# Patient Record
Sex: FEMALE | Race: WHITE | NOT HISPANIC OR LATINO | Employment: UNEMPLOYED | URBAN - METROPOLITAN AREA
[De-identification: names, ages, dates, MRNs, and addresses within clinical notes are randomized per-mention and may not be internally consistent; named-entity substitution may affect disease eponyms.]

---

## 2017-06-02 ENCOUNTER — HISTORICAL (OUTPATIENT)
Dept: LAB | Facility: HOSPITAL | Age: 72
End: 2017-06-02

## 2017-06-02 LAB
ABS NEUT (OLG): 3.58 X10(3)/MCL (ref 2.1–9.2)
ALBUMIN SERPL-MCNC: 3.6 GM/DL (ref 3.4–5)
ALBUMIN/GLOB SERPL: 1.2 {RATIO}
ALP SERPL-CCNC: 61 UNIT/L (ref 38–126)
ALT SERPL-CCNC: 27 UNIT/L (ref 12–78)
AST SERPL-CCNC: 17 UNIT/L (ref 15–37)
BASOPHILS # BLD AUTO: 0.1 X10(3)/MCL (ref 0–0.2)
BASOPHILS NFR BLD AUTO: 1 %
BILIRUB SERPL-MCNC: 0.4 MG/DL (ref 0.2–1)
BILIRUBIN DIRECT+TOT PNL SERPL-MCNC: 0.2 MG/DL (ref 0–0.2)
BILIRUBIN DIRECT+TOT PNL SERPL-MCNC: 0.2 MG/DL (ref 0–0.8)
BUN SERPL-MCNC: 15 MG/DL (ref 7–18)
CALCIUM SERPL-MCNC: 9.5 MG/DL (ref 8.5–10.1)
CHLORIDE SERPL-SCNC: 105 MMOL/L (ref 98–107)
CHOLEST SERPL-MCNC: 142 MG/DL (ref 0–200)
CHOLEST/HDLC SERPL: 1.7 {RATIO} (ref 0–4)
CO2 SERPL-SCNC: 27 MMOL/L (ref 21–32)
CREAT SERPL-MCNC: 0.91 MG/DL (ref 0.55–1.02)
CREAT UR-MCNC: 40.7 MG/DL
EOSINOPHIL # BLD AUTO: 0.2 X10(3)/MCL (ref 0–0.9)
EOSINOPHIL NFR BLD AUTO: 3 %
ERYTHROCYTE [DISTWIDTH] IN BLOOD BY AUTOMATED COUNT: 12.7 % (ref 11.5–17)
EST. AVERAGE GLUCOSE BLD GHB EST-MCNC: 143 MG/DL
GLOBULIN SER-MCNC: 2.9 GM/DL (ref 2.4–3.5)
GLUCOSE SERPL-MCNC: 193 MG/DL (ref 74–106)
HBA1C MFR BLD: 6.6 % (ref 4.2–6.3)
HCT VFR BLD AUTO: 39.7 % (ref 37–47)
HDLC SERPL-MCNC: 83 MG/DL (ref 35–60)
HGB BLD-MCNC: 12.8 GM/DL (ref 12–16)
LDLC SERPL CALC-MCNC: 46 MG/DL (ref 0–129)
LYMPHOCYTES # BLD AUTO: 1.7 X10(3)/MCL (ref 0.6–4.6)
LYMPHOCYTES NFR BLD AUTO: 29 %
MCH RBC QN AUTO: 29.3 PG (ref 27–31)
MCHC RBC AUTO-ENTMCNC: 32.2 GM/DL (ref 33–36)
MCV RBC AUTO: 90.8 FL (ref 80–94)
MICROALBUMIN UR-MCNC: 0.9 MG/DL
MICROALBUMIN/CREAT RATIO PNL UR: 22.1 MG/GM CR (ref 0–30)
MONOCYTES # BLD AUTO: 0.4 X10(3)/MCL (ref 0.1–1.3)
MONOCYTES NFR BLD AUTO: 6 %
NEUTROPHILS # BLD AUTO: 3.58 X10(3)/MCL (ref 1.4–7.9)
NEUTROPHILS NFR BLD AUTO: 60 %
PLATELET # BLD AUTO: 331 X10(3)/MCL (ref 130–400)
PMV BLD AUTO: 9 FL (ref 9.4–12.4)
POTASSIUM SERPL-SCNC: 4.1 MMOL/L (ref 3.5–5.1)
PROT SERPL-MCNC: 6.5 GM/DL (ref 6.4–8.2)
RBC # BLD AUTO: 4.37 X10(6)/MCL (ref 4.2–5.4)
SODIUM SERPL-SCNC: 142 MMOL/L (ref 136–145)
TRIGL SERPL-MCNC: 63 MG/DL (ref 30–150)
TSH SERPL-ACNC: 1.13 MIU/ML (ref 0.36–3.74)
VLDLC SERPL CALC-MCNC: 13 MG/DL
WBC # SPEC AUTO: 6 X10(3)/MCL (ref 4.5–11.5)

## 2017-11-30 ENCOUNTER — HISTORICAL (OUTPATIENT)
Dept: LAB | Facility: HOSPITAL | Age: 72
End: 2017-11-30

## 2017-11-30 LAB
ABS NEUT (OLG): 4.54 X10(3)/MCL (ref 2.1–9.2)
ALBUMIN SERPL-MCNC: 4.2 GM/DL (ref 3.4–5)
ALBUMIN/GLOB SERPL: 1.2 RATIO (ref 1.1–2)
ALP SERPL-CCNC: 67 UNIT/L (ref 38–126)
ALT SERPL-CCNC: 28 UNIT/L (ref 12–78)
AST SERPL-CCNC: 20 UNIT/L (ref 15–37)
BASOPHILS # BLD AUTO: 0.1 X10(3)/MCL (ref 0–0.2)
BASOPHILS NFR BLD AUTO: 1 %
BILIRUB SERPL-MCNC: 0.7 MG/DL (ref 0.2–1)
BILIRUBIN DIRECT+TOT PNL SERPL-MCNC: 0.2 MG/DL (ref 0–0.5)
BILIRUBIN DIRECT+TOT PNL SERPL-MCNC: 0.5 MG/DL (ref 0–0.8)
BUN SERPL-MCNC: 17 MG/DL (ref 7–18)
CALCIUM SERPL-MCNC: 10 MG/DL (ref 8.5–10.1)
CHLORIDE SERPL-SCNC: 104 MMOL/L (ref 98–107)
CHOLEST SERPL-MCNC: 189 MG/DL (ref 0–200)
CHOLEST/HDLC SERPL: 1.8 {RATIO} (ref 0–4)
CO2 SERPL-SCNC: 28 MMOL/L (ref 21–32)
CREAT SERPL-MCNC: 0.82 MG/DL (ref 0.55–1.02)
CREAT UR-MCNC: 25.6 MG/DL
EOSINOPHIL # BLD AUTO: 0.3 X10(3)/MCL (ref 0–0.9)
EOSINOPHIL NFR BLD AUTO: 3 %
ERYTHROCYTE [DISTWIDTH] IN BLOOD BY AUTOMATED COUNT: 13.1 % (ref 11.5–17)
EST. AVERAGE GLUCOSE BLD GHB EST-MCNC: 148 MG/DL
GLOBULIN SER-MCNC: 3.4 GM/DL (ref 2.4–3.5)
GLUCOSE SERPL-MCNC: 85 MG/DL (ref 74–106)
HBA1C MFR BLD: 6.8 % (ref 4.2–6.3)
HCT VFR BLD AUTO: 42.5 % (ref 37–47)
HDLC SERPL-MCNC: 106 MG/DL (ref 35–60)
HGB BLD-MCNC: 13.5 GM/DL (ref 12–16)
LDLC SERPL CALC-MCNC: 67 MG/DL (ref 0–129)
LYMPHOCYTES # BLD AUTO: 2.1 X10(3)/MCL (ref 0.6–4.6)
LYMPHOCYTES NFR BLD AUTO: 28 %
MCH RBC QN AUTO: 28.8 PG (ref 27–31)
MCHC RBC AUTO-ENTMCNC: 31.8 GM/DL (ref 33–36)
MCV RBC AUTO: 90.8 FL (ref 80–94)
MICROALBUMIN UR-MCNC: 0.9 MG/DL
MICROALBUMIN/CREAT RATIO PNL UR: 35.8 MG/GM CR (ref 0–30)
MONOCYTES # BLD AUTO: 0.6 X10(3)/MCL (ref 0.1–1.3)
MONOCYTES NFR BLD AUTO: 7 %
NEUTROPHILS # BLD AUTO: 4.54 X10(3)/MCL (ref 1.4–7.9)
NEUTROPHILS NFR BLD AUTO: 60 %
PLATELET # BLD AUTO: 336 X10(3)/MCL (ref 130–400)
PMV BLD AUTO: 8.7 FL (ref 9.4–12.4)
POTASSIUM SERPL-SCNC: 4.2 MMOL/L (ref 3.5–5.1)
PROT SERPL-MCNC: 7.6 GM/DL (ref 6.4–8.2)
RBC # BLD AUTO: 4.68 X10(6)/MCL (ref 4.2–5.4)
SODIUM SERPL-SCNC: 141 MMOL/L (ref 136–145)
TRIGL SERPL-MCNC: 80 MG/DL (ref 30–150)
TSH SERPL-ACNC: 0.92 MIU/ML (ref 0.36–3.74)
VLDLC SERPL CALC-MCNC: 16 MG/DL
WBC # SPEC AUTO: 7.6 X10(3)/MCL (ref 4.5–11.5)

## 2018-02-16 ENCOUNTER — HISTORICAL (OUTPATIENT)
Dept: LAB | Facility: HOSPITAL | Age: 73
End: 2018-02-16

## 2018-02-16 LAB
ABS NEUT (OLG): 5.95 X10(3)/MCL (ref 2.1–9.2)
ALBUMIN SERPL-MCNC: 4 GM/DL (ref 3.4–5)
ALBUMIN/GLOB SERPL: 1.1 {RATIO}
ALP SERPL-CCNC: 69 UNIT/L (ref 38–126)
ALT SERPL-CCNC: 27 UNIT/L (ref 12–78)
AST SERPL-CCNC: 16 UNIT/L (ref 15–37)
BASOPHILS # BLD AUTO: 0.1 X10(3)/MCL (ref 0–0.2)
BASOPHILS NFR BLD AUTO: 1 %
BILIRUB SERPL-MCNC: 0.6 MG/DL (ref 0.2–1)
BILIRUBIN DIRECT+TOT PNL SERPL-MCNC: 0.2 MG/DL (ref 0–0.2)
BILIRUBIN DIRECT+TOT PNL SERPL-MCNC: 0.4 MG/DL (ref 0–0.8)
BUN SERPL-MCNC: 20 MG/DL (ref 7–18)
CALCIUM SERPL-MCNC: 9.9 MG/DL (ref 8.5–10.1)
CHLORIDE SERPL-SCNC: 100 MMOL/L (ref 98–107)
CO2 SERPL-SCNC: 26 MMOL/L (ref 21–32)
CREAT SERPL-MCNC: 0.95 MG/DL (ref 0.55–1.02)
CREAT UR-MCNC: 28.7 MG/DL
EOSINOPHIL # BLD AUTO: 0.2 X10(3)/MCL (ref 0–0.9)
EOSINOPHIL NFR BLD AUTO: 2 %
ERYTHROCYTE [DISTWIDTH] IN BLOOD BY AUTOMATED COUNT: 12.6 % (ref 11.5–17)
EST. AVERAGE GLUCOSE BLD GHB EST-MCNC: 163 MG/DL
GLOBULIN SER-MCNC: 3.5 GM/DL (ref 2.4–3.5)
GLUCOSE SERPL-MCNC: 110 MG/DL (ref 74–106)
HBA1C MFR BLD: 7.3 % (ref 4.2–6.3)
HCT VFR BLD AUTO: 41.3 % (ref 37–47)
HGB BLD-MCNC: 13.8 GM/DL (ref 12–16)
LYMPHOCYTES # BLD AUTO: 1.6 X10(3)/MCL (ref 0.6–4.6)
LYMPHOCYTES NFR BLD AUTO: 19 %
MCH RBC QN AUTO: 28.5 PG (ref 27–31)
MCHC RBC AUTO-ENTMCNC: 33.4 GM/DL (ref 33–36)
MCV RBC AUTO: 85.2 FL (ref 80–94)
MICROALBUMIN UR-MCNC: 0.8 MG/DL
MICROALBUMIN/CREAT RATIO PNL UR: 27.3 MG/GM CR (ref 0–30)
MONOCYTES # BLD AUTO: 0.6 X10(3)/MCL (ref 0.1–1.3)
MONOCYTES NFR BLD AUTO: 7 %
NEUTROPHILS # BLD AUTO: 5.95 X10(3)/MCL (ref 2.1–9.2)
NEUTROPHILS NFR BLD AUTO: 71 %
PLATELET # BLD AUTO: 381 X10(3)/MCL (ref 130–400)
PMV BLD AUTO: 8.2 FL (ref 9.4–12.4)
POTASSIUM SERPL-SCNC: 4 MMOL/L (ref 3.5–5.1)
PROT SERPL-MCNC: 7.5 GM/DL (ref 6.4–8.2)
RBC # BLD AUTO: 4.85 X10(6)/MCL (ref 4.2–5.4)
SODIUM SERPL-SCNC: 136 MMOL/L (ref 136–145)
TSH SERPL-ACNC: 1.8 MIU/ML (ref 0.36–3.74)
WBC # SPEC AUTO: 8.4 X10(3)/MCL (ref 4.5–11.5)

## 2018-04-20 ENCOUNTER — HISTORICAL (OUTPATIENT)
Dept: LAB | Facility: HOSPITAL | Age: 73
End: 2018-04-20

## 2018-04-20 LAB
ABS NEUT (OLG): 3.91 X10(3)/MCL (ref 2.1–9.2)
ALBUMIN SERPL-MCNC: 4.1 GM/DL (ref 3.4–5)
ALBUMIN/GLOB SERPL: 1.3 RATIO (ref 1.1–2)
ALP SERPL-CCNC: 64 UNIT/L (ref 38–126)
ALT SERPL-CCNC: 30 UNIT/L (ref 12–78)
AST SERPL-CCNC: 22 UNIT/L (ref 15–37)
BASOPHILS # BLD AUTO: 0 X10(3)/MCL (ref 0–0.2)
BASOPHILS NFR BLD AUTO: 1 %
BILIRUB SERPL-MCNC: 0.8 MG/DL (ref 0.2–1)
BILIRUBIN DIRECT+TOT PNL SERPL-MCNC: 0.2 MG/DL (ref 0–0.5)
BILIRUBIN DIRECT+TOT PNL SERPL-MCNC: 0.6 MG/DL (ref 0–0.8)
BUN SERPL-MCNC: 12 MG/DL (ref 7–18)
CALCIUM SERPL-MCNC: 9.9 MG/DL (ref 8.5–10.1)
CHLORIDE SERPL-SCNC: 97 MMOL/L (ref 98–107)
CO2 SERPL-SCNC: 29 MMOL/L (ref 21–32)
CREAT SERPL-MCNC: 0.86 MG/DL (ref 0.55–1.02)
EOSINOPHIL # BLD AUTO: 0.2 X10(3)/MCL (ref 0–0.9)
EOSINOPHIL NFR BLD AUTO: 3 %
ERYTHROCYTE [DISTWIDTH] IN BLOOD BY AUTOMATED COUNT: 12.5 % (ref 11.5–17)
GLOBULIN SER-MCNC: 3.1 GM/DL (ref 2.4–3.5)
GLUCOSE SERPL-MCNC: 122 MG/DL (ref 74–106)
HCT VFR BLD AUTO: 41.7 % (ref 37–47)
HGB BLD-MCNC: 13.7 GM/DL (ref 12–16)
LYMPHOCYTES # BLD AUTO: 1.4 X10(3)/MCL (ref 0.6–4.6)
LYMPHOCYTES NFR BLD AUTO: 23 %
MCH RBC QN AUTO: 29.4 PG (ref 27–31)
MCHC RBC AUTO-ENTMCNC: 32.9 GM/DL (ref 33–36)
MCV RBC AUTO: 89.5 FL (ref 80–94)
MONOCYTES # BLD AUTO: 0.4 X10(3)/MCL (ref 0.1–1.3)
MONOCYTES NFR BLD AUTO: 8 %
NEUTROPHILS # BLD AUTO: 3.91 X10(3)/MCL (ref 1.4–7.9)
NEUTROPHILS NFR BLD AUTO: 65 %
PLATELET # BLD AUTO: 372 X10(3)/MCL (ref 130–400)
PMV BLD AUTO: 8.2 FL (ref 9.4–12.4)
POTASSIUM SERPL-SCNC: 3.8 MMOL/L (ref 3.5–5.1)
PROT SERPL-MCNC: 7.2 GM/DL (ref 6.4–8.2)
RBC # BLD AUTO: 4.66 X10(6)/MCL (ref 4.2–5.4)
SODIUM SERPL-SCNC: 136 MMOL/L (ref 136–145)
WBC # SPEC AUTO: 6 X10(3)/MCL (ref 4.5–11.5)

## 2018-04-30 ENCOUNTER — HISTORICAL (OUTPATIENT)
Dept: LAB | Facility: HOSPITAL | Age: 73
End: 2018-04-30

## 2018-04-30 LAB
CHOLEST SERPL-MCNC: 167 MG/DL (ref 0–200)
CHOLEST/HDLC SERPL: 2 {RATIO} (ref 0–4)
HDLC SERPL-MCNC: 83 MG/DL (ref 35–60)
LDLC SERPL CALC-MCNC: 74 MG/DL (ref 0–129)
TRIGL SERPL-MCNC: 48 MG/DL (ref 30–150)
TSH SERPL-ACNC: 1.84 MIU/L (ref 0.36–3.74)
VLDLC SERPL CALC-MCNC: 10 MG/DL

## 2018-07-20 ENCOUNTER — HISTORICAL (OUTPATIENT)
Dept: LAB | Facility: HOSPITAL | Age: 73
End: 2018-07-20

## 2018-07-20 LAB
ALBUMIN SERPL-MCNC: 4.1 GM/DL (ref 3.4–5)
ALBUMIN/GLOB SERPL: 1 {RATIO}
ALP SERPL-CCNC: 71 UNIT/L (ref 45–117)
ALT SERPL-CCNC: 25 UNIT/L (ref 13–56)
AST SERPL-CCNC: 21 UNIT/L (ref 15–37)
BILIRUB SERPL-MCNC: 0.6 MG/DL (ref 0.2–1)
BILIRUBIN DIRECT+TOT PNL SERPL-MCNC: 0.1 MG/DL (ref 0–0.2)
BILIRUBIN DIRECT+TOT PNL SERPL-MCNC: 0.5 MG/DL (ref 0–1)
BUN SERPL-MCNC: 19 MG/DL (ref 7–18)
CALCIUM SERPL-MCNC: 9.5 MG/DL (ref 8.5–10.1)
CHLORIDE SERPL-SCNC: 102 MMOL/L (ref 98–107)
CHOLEST SERPL-MCNC: 176 MG/DL (ref 0–200)
CHOLEST/HDLC SERPL: 2.1 {RATIO} (ref 0–4)
CO2 SERPL-SCNC: 29 MMOL/L (ref 21–32)
CREAT SERPL-MCNC: 0.96 MG/DL (ref 0.55–1.02)
EST. AVERAGE GLUCOSE BLD GHB EST-MCNC: 154 MG/DL
GLOBULIN SER-MCNC: 4 GM/DL (ref 2–4)
GLUCOSE SERPL-MCNC: 81 MG/DL (ref 74–106)
HBA1C MFR BLD: 7 % (ref 4.2–6.3)
HDLC SERPL-MCNC: 84 MG/DL (ref 35–60)
LDLC SERPL CALC-MCNC: 70 MG/DL (ref 0–129)
POTASSIUM SERPL-SCNC: 3.7 MMOL/L (ref 3.5–5.1)
PROT SERPL-MCNC: 8.1 GM/DL (ref 6.4–8.2)
SODIUM SERPL-SCNC: 141 MMOL/L (ref 136–145)
TRIGL SERPL-MCNC: 112 MG/DL (ref 30–150)
TSH SERPL-ACNC: 0.63 MIU/ML (ref 0.36–3.74)
VLDLC SERPL CALC-MCNC: 22 MG/DL

## 2018-11-20 ENCOUNTER — HISTORICAL (OUTPATIENT)
Dept: LAB | Facility: HOSPITAL | Age: 73
End: 2018-11-20

## 2018-11-20 LAB
ALBUMIN SERPL-MCNC: 3.9 GM/DL (ref 3.4–5)
ALBUMIN/GLOB SERPL: 1.1 RATIO (ref 1.1–2)
ALP SERPL-CCNC: 66 UNIT/L (ref 38–126)
ALT SERPL-CCNC: 27 UNIT/L (ref 12–78)
AST SERPL-CCNC: 19 UNIT/L (ref 15–37)
BILIRUB SERPL-MCNC: 0.5 MG/DL (ref 0.2–1)
BILIRUBIN DIRECT+TOT PNL SERPL-MCNC: 0.2 MG/DL (ref 0–0.5)
BILIRUBIN DIRECT+TOT PNL SERPL-MCNC: 0.3 MG/DL (ref 0–0.8)
BUN SERPL-MCNC: 16 MG/DL (ref 7–18)
CALCIUM SERPL-MCNC: 9.8 MG/DL (ref 8.5–10.1)
CHLORIDE SERPL-SCNC: 100 MMOL/L (ref 98–107)
CO2 SERPL-SCNC: 28 MMOL/L (ref 21–32)
CREAT SERPL-MCNC: 0.81 MG/DL (ref 0.55–1.02)
CREAT UR-MCNC: 32 MG/DL
EST. AVERAGE GLUCOSE BLD GHB EST-MCNC: 148 MG/DL
GLOBULIN SER-MCNC: 3.5 GM/DL (ref 2.4–3.5)
GLUCOSE SERPL-MCNC: 80 MG/DL (ref 74–106)
HBA1C MFR BLD: 6.8 % (ref 4.2–6.3)
MICROALBUMIN UR-MCNC: 0.6 MG/DL
MICROALBUMIN/CREAT RATIO PNL UR: 18.8 MG/GM CR (ref 0–30)
POTASSIUM SERPL-SCNC: 3.7 MMOL/L (ref 3.5–5.1)
PROT SERPL-MCNC: 7.4 GM/DL (ref 6.4–8.2)
SODIUM SERPL-SCNC: 138 MMOL/L (ref 136–145)
TSH SERPL-ACNC: 2.24 MIU/L (ref 0.36–3.74)

## 2019-05-16 ENCOUNTER — HISTORICAL (OUTPATIENT)
Dept: LAB | Facility: HOSPITAL | Age: 74
End: 2019-05-16

## 2019-05-16 LAB
ABS NEUT (OLG): 8.58 X10(3)/MCL (ref 2.1–9.2)
ALBUMIN SERPL-MCNC: 4.2 GM/DL (ref 3.4–5)
ALBUMIN/GLOB SERPL: 1.4 {RATIO}
ALP SERPL-CCNC: 80 UNIT/L (ref 38–126)
ALT SERPL-CCNC: 25 UNIT/L (ref 12–78)
AST SERPL-CCNC: 19 UNIT/L (ref 15–37)
BASOPHILS # BLD AUTO: 0.1 X10(3)/MCL (ref 0–0.2)
BASOPHILS NFR BLD AUTO: 0 %
BILIRUB SERPL-MCNC: 1.5 MG/DL (ref 0.2–1)
BILIRUBIN DIRECT+TOT PNL SERPL-MCNC: 0.3 MG/DL (ref 0–0.2)
BILIRUBIN DIRECT+TOT PNL SERPL-MCNC: 1.2 MG/DL (ref 0–0.8)
BUN SERPL-MCNC: 14 MG/DL (ref 7–18)
CALCIUM SERPL-MCNC: 10 MG/DL (ref 8.5–10.1)
CHLORIDE SERPL-SCNC: 99 MMOL/L (ref 98–107)
CHOLEST SERPL-MCNC: 149 MG/DL (ref 0–200)
CHOLEST/HDLC SERPL: 1.7 {RATIO} (ref 0–4)
CO2 SERPL-SCNC: 31 MMOL/L (ref 21–32)
CREAT SERPL-MCNC: 0.9 MG/DL (ref 0.55–1.02)
EOSINOPHIL # BLD AUTO: 0.2 X10(3)/MCL (ref 0–0.9)
EOSINOPHIL NFR BLD AUTO: 1 %
ERYTHROCYTE [DISTWIDTH] IN BLOOD BY AUTOMATED COUNT: 12.5 % (ref 11.5–17)
EST. AVERAGE GLUCOSE BLD GHB EST-MCNC: 160 MG/DL
GLOBULIN SER-MCNC: 3 GM/DL (ref 2.4–3.5)
GLUCOSE SERPL-MCNC: 168 MG/DL (ref 74–106)
HBA1C MFR BLD: 7.2 % (ref 4.2–6.3)
HCT VFR BLD AUTO: 44.7 % (ref 37–47)
HDLC SERPL-MCNC: 87 MG/DL (ref 35–60)
HGB BLD-MCNC: 14.3 GM/DL (ref 12–16)
LDLC SERPL CALC-MCNC: 48 MG/DL (ref 0–129)
LYMPHOCYTES # BLD AUTO: 1.4 X10(3)/MCL (ref 0.6–4.6)
LYMPHOCYTES NFR BLD AUTO: 12 %
MCH RBC QN AUTO: 28.7 PG (ref 27–31)
MCHC RBC AUTO-ENTMCNC: 32 GM/DL (ref 33–36)
MCV RBC AUTO: 89.8 FL (ref 80–94)
MONOCYTES # BLD AUTO: 0.9 X10(3)/MCL (ref 0.1–1.3)
MONOCYTES NFR BLD AUTO: 8 %
NEUTROPHILS # BLD AUTO: 8.58 X10(3)/MCL (ref 2.1–9.2)
NEUTROPHILS NFR BLD AUTO: 77 %
PLATELET # BLD AUTO: 358 X10(3)/MCL (ref 130–400)
PMV BLD AUTO: 8.8 FL (ref 9.4–12.4)
POTASSIUM SERPL-SCNC: 3.6 MMOL/L (ref 3.5–5.1)
PROT SERPL-MCNC: 7.2 GM/DL (ref 6.4–8.2)
RBC # BLD AUTO: 4.98 X10(6)/MCL (ref 4.2–5.4)
SODIUM SERPL-SCNC: 137 MMOL/L (ref 136–145)
TRIGL SERPL-MCNC: 72 MG/DL (ref 30–150)
TSH SERPL-ACNC: 0.58 MIU/L (ref 0.36–3.74)
VLDLC SERPL CALC-MCNC: 14 MG/DL
WBC # SPEC AUTO: 11.1 X10(3)/MCL (ref 4.5–11.5)

## 2019-05-22 ENCOUNTER — HISTORICAL (OUTPATIENT)
Dept: LAB | Facility: HOSPITAL | Age: 74
End: 2019-05-22

## 2019-05-22 LAB
ABS NEUT (OLG): 4.57 X10(3)/MCL (ref 2.1–9.2)
ALBUMIN SERPL-MCNC: 3.8 GM/DL (ref 3.4–5)
ALBUMIN/GLOB SERPL: 1 {RATIO}
ALP SERPL-CCNC: 80 UNIT/L (ref 45–117)
ALT SERPL-CCNC: 24 UNIT/L (ref 13–56)
AST SERPL-CCNC: 22 UNIT/L (ref 15–37)
BASOPHILS # BLD AUTO: 0.05 X10(3)/MCL (ref 0–0.2)
BASOPHILS NFR BLD AUTO: 0.7 % (ref 0–1)
BILIRUB SERPL-MCNC: 0.6 MG/DL (ref 0.2–1)
BILIRUBIN DIRECT+TOT PNL SERPL-MCNC: 0.15 MG/DL (ref 0–0.2)
BILIRUBIN DIRECT+TOT PNL SERPL-MCNC: 0.45 MG/DL (ref 0–1)
BUN SERPL-MCNC: 12 MG/DL (ref 7–18)
CALCIUM SERPL-MCNC: 9 MG/DL (ref 8.5–10.1)
CHLORIDE SERPL-SCNC: 94 MMOL/L (ref 98–107)
CO2 SERPL-SCNC: 26 MMOL/L (ref 21–32)
CREAT SERPL-MCNC: 0.85 MG/DL (ref 0.55–1.02)
CREAT UR-MCNC: 40 MG/DL
CRP SERPL HS-MCNC: 1.9 MG/L
EOSINOPHIL # BLD AUTO: 0.17 X10(3)/MCL (ref 0–0.9)
EOSINOPHIL NFR BLD AUTO: 2.5 % (ref 0–6.4)
ERYTHROCYTE [DISTWIDTH] IN BLOOD BY AUTOMATED COUNT: 11.9 % (ref 11.5–17)
ERYTHROCYTE [SEDIMENTATION RATE] IN BLOOD: 16 MM/HR (ref 0–30)
GLOBULIN SER-MCNC: 4 GM/DL (ref 2–4)
GLUCOSE SERPL-MCNC: 125 MG/DL (ref 74–106)
HCT VFR BLD AUTO: 40.3 % (ref 37–47)
HGB BLD-MCNC: 14 GM/DL (ref 12–16)
IMM GRANULOCYTES # BLD AUTO: 0.02 10*3/UL (ref 0–0.02)
IMM GRANULOCYTES NFR BLD AUTO: 0.3 % (ref 0–0.43)
LYMPHOCYTES # BLD AUTO: 1.42 X10(3)/MCL (ref 0.6–4.6)
LYMPHOCYTES NFR BLD AUTO: 20.9 % (ref 16–44)
MCH RBC QN AUTO: 29.7 PG (ref 27–31)
MCHC RBC AUTO-ENTMCNC: 34.7 GM/DL (ref 33–36)
MCV RBC AUTO: 85.4 FL (ref 80–94)
MICROALBUMIN UR-MCNC: 0.8 MG/DL
MICROALBUMIN/CREAT RATIO PNL UR: 20 MG/GM CR (ref 0–30)
MONOCYTES # BLD AUTO: 0.55 X10(3)/MCL (ref 0.1–1.3)
MONOCYTES NFR BLD AUTO: 8.1 % (ref 4–12.1)
NEUTROPHILS # BLD AUTO: 4.57 X10(3)/MCL (ref 2.1–9.2)
NEUTROPHILS NFR BLD AUTO: 67.5 % (ref 43–73)
NRBC BLD AUTO-RTO: 0 % (ref 0–0.2)
PLATELET # BLD AUTO: 334 X10(3)/MCL (ref 130–400)
PMV BLD AUTO: 8.1 FL (ref 7.4–10.4)
POTASSIUM SERPL-SCNC: 3.1 MMOL/L (ref 3.5–5.1)
PROT SERPL-MCNC: 7.8 GM/DL (ref 6.4–8.2)
RBC # BLD AUTO: 4.72 X10(6)/MCL (ref 4.2–5.4)
SODIUM SERPL-SCNC: 132 MMOL/L (ref 136–145)
WBC # SPEC AUTO: 6.8 X10(3)/MCL (ref 4.5–11.5)

## 2019-05-24 ENCOUNTER — HISTORICAL (OUTPATIENT)
Dept: ADMINISTRATIVE | Facility: HOSPITAL | Age: 74
End: 2019-05-24

## 2019-06-05 ENCOUNTER — HISTORICAL (OUTPATIENT)
Dept: LAB | Facility: HOSPITAL | Age: 74
End: 2019-06-05

## 2019-06-05 LAB
BUN SERPL-MCNC: 9 MG/DL (ref 7–18)
CALCIUM SERPL-MCNC: 9.9 MG/DL (ref 8.5–10.1)
CHLORIDE SERPL-SCNC: 85 MMOL/L (ref 98–107)
CO2 SERPL-SCNC: 27 MMOL/L (ref 21–32)
CREAT SERPL-MCNC: 0.98 MG/DL (ref 0.55–1.02)
CREAT/UREA NIT SERPL: 9.2
GLUCOSE SERPL-MCNC: 131 MG/DL (ref 74–106)
POTASSIUM SERPL-SCNC: 4.1 MMOL/L (ref 3.5–5.1)
SODIUM SERPL-SCNC: 124 MMOL/L (ref 136–145)

## 2019-06-19 ENCOUNTER — HISTORICAL (OUTPATIENT)
Dept: LAB | Facility: HOSPITAL | Age: 74
End: 2019-06-19

## 2019-06-19 LAB
BUN SERPL-MCNC: 15 MG/DL (ref 7–18)
CALCIUM SERPL-MCNC: 9.7 MG/DL (ref 8.5–10.1)
CHLORIDE SERPL-SCNC: 94 MMOL/L (ref 98–107)
CO2 SERPL-SCNC: 28 MMOL/L (ref 21–32)
CREAT SERPL-MCNC: 0.96 MG/DL (ref 0.55–1.02)
CREAT/UREA NIT SERPL: 16
GLUCOSE SERPL-MCNC: 132 MG/DL (ref 74–106)
POTASSIUM SERPL-SCNC: 3.9 MMOL/L (ref 3.5–5.1)
SODIUM SERPL-SCNC: 130 MMOL/L (ref 136–145)

## 2019-08-28 ENCOUNTER — HISTORICAL (OUTPATIENT)
Dept: LAB | Facility: HOSPITAL | Age: 74
End: 2019-08-28

## 2019-08-28 LAB
ALBUMIN SERPL-MCNC: 4.5 GM/DL (ref 3.4–5)
ALBUMIN/GLOB SERPL: 1.1 {RATIO}
ALP SERPL-CCNC: 73 UNIT/L (ref 45–117)
ALT SERPL-CCNC: 22 UNIT/L (ref 13–56)
AST SERPL-CCNC: 21 UNIT/L (ref 15–37)
BILIRUB SERPL-MCNC: 0.7 MG/DL (ref 0.2–1)
BILIRUBIN DIRECT+TOT PNL SERPL-MCNC: 0.19 MG/DL (ref 0–0.2)
BILIRUBIN DIRECT+TOT PNL SERPL-MCNC: 0.51 MG/DL (ref 0–1)
BUN SERPL-MCNC: 12 MG/DL (ref 7–18)
CALCIUM SERPL-MCNC: 10.1 MG/DL (ref 8.5–10.1)
CHLORIDE SERPL-SCNC: 106 MMOL/L (ref 98–107)
CO2 SERPL-SCNC: 29 MMOL/L (ref 21–32)
CREAT SERPL-MCNC: 0.96 MG/DL (ref 0.55–1.02)
CREAT UR-MCNC: 33 MG/DL
EST. AVERAGE GLUCOSE BLD GHB EST-MCNC: 148 MG/DL
GLOBULIN SER-MCNC: 4 GM/DL (ref 2–4)
GLUCOSE SERPL-MCNC: 69 MG/DL (ref 74–106)
HBA1C MFR BLD: 6.8 % (ref 4.2–6.3)
MICROALBUMIN UR-MCNC: 1.2 MG/DL
MICROALBUMIN/CREAT RATIO PNL UR: 36.4 MG/GM CR (ref 0–30)
POTASSIUM SERPL-SCNC: 4.6 MMOL/L (ref 3.5–5.1)
PROT SERPL-MCNC: 8.4 GM/DL (ref 6.4–8.2)
SODIUM SERPL-SCNC: 143 MMOL/L (ref 136–145)
T3FREE SERPL-MCNC: 2.78 PG/ML (ref 2.18–3.98)
T4 FREE SERPL-MCNC: 1.43 NG/DL (ref 0.76–1.46)
TSH SERPL-ACNC: 0.38 MIU/ML (ref 0.36–3.74)

## 2019-12-27 ENCOUNTER — HISTORICAL (OUTPATIENT)
Dept: LAB | Facility: HOSPITAL | Age: 74
End: 2019-12-27

## 2019-12-27 LAB
CHOLEST SERPL-MCNC: 166 MG/DL (ref 0–200)
CHOLEST/HDLC SERPL: 1.8 {RATIO} (ref 0–4)
CREAT UR-MCNC: 93 MG/DL
EST. AVERAGE GLUCOSE BLD GHB EST-MCNC: 148 MG/DL
HBA1C MFR BLD: 6.8 % (ref 4.2–6.3)
HDLC SERPL-MCNC: 93 MG/DL (ref 35–60)
LDLC SERPL CALC-MCNC: 60 MG/DL (ref 0–129)
MICROALBUMIN UR-MCNC: 10.6 MG/DL
MICROALBUMIN/CREAT RATIO PNL UR: 114 MG/GM CR (ref 0–30)
TRIGL SERPL-MCNC: 64 MG/DL (ref 30–150)
VLDLC SERPL CALC-MCNC: 13 MG/DL

## 2020-07-01 ENCOUNTER — HISTORICAL (OUTPATIENT)
Dept: LAB | Facility: HOSPITAL | Age: 75
End: 2020-07-01

## 2020-07-01 LAB
ABS NEUT (OLG): 5.06 X10(3)/MCL (ref 2.1–9.2)
ALBUMIN SERPL-MCNC: 4 GM/DL (ref 3.4–4.8)
ALBUMIN/GLOB SERPL: 1.3 RATIO (ref 1.1–2)
ALP SERPL-CCNC: 81 UNIT/L (ref 40–150)
ALT SERPL-CCNC: 13 UNIT/L (ref 0–55)
APPEARANCE, UA: CLEAR
AST SERPL-CCNC: 16 UNIT/L (ref 5–34)
BACTERIA SPEC CULT: ABNORMAL
BASOPHILS # BLD AUTO: 0.07 X10(3)/MCL (ref 0–0.2)
BASOPHILS NFR BLD AUTO: 0.9 % (ref 0–1)
BILIRUB SERPL-MCNC: 0.8 MG/DL (ref 0.2–1.2)
BILIRUB UR QL STRIP: NEGATIVE
BILIRUBIN DIRECT+TOT PNL SERPL-MCNC: 0.3 MG/DL (ref 0–0.5)
BILIRUBIN DIRECT+TOT PNL SERPL-MCNC: 0.5 MG/DL (ref 0–0.8)
BUN SERPL-MCNC: 12.5 MG/DL (ref 9.8–20.1)
CALCIUM SERPL-MCNC: 10.2 MG/DL (ref 8.4–10.2)
CHLORIDE SERPL-SCNC: 104 MMOL/L (ref 98–107)
CHOLEST SERPL-MCNC: 157 MG/DL
CHOLEST/HDLC SERPL: 2 {RATIO} (ref 0–5)
CO2 SERPL-SCNC: 27 MMOL/L (ref 23–31)
COLOR UR: YELLOW
CREAT SERPL-MCNC: 0.81 MG/DL (ref 0.57–1.11)
CREAT UR-MCNC: 88.6 MG/DL (ref 45–106)
EOSINOPHIL # BLD AUTO: 0.26 X10(3)/MCL (ref 0–0.9)
EOSINOPHIL NFR BLD AUTO: 3.3 % (ref 0–6.4)
ERYTHROCYTE [DISTWIDTH] IN BLOOD BY AUTOMATED COUNT: 13 % (ref 11.5–17)
EST. AVERAGE GLUCOSE BLD GHB EST-MCNC: 217.3 MG/DL
GLOBULIN SER-MCNC: 3 GM/DL (ref 2.4–3.5)
GLUCOSE (UA): ABNORMAL
GLUCOSE SERPL-MCNC: 205 MG/DL (ref 82–115)
HBA1C MFR BLD: 9.2 %
HCT VFR BLD AUTO: 41 % (ref 37–47)
HDLC SERPL-MCNC: 70 MG/DL (ref 40–60)
HGB BLD-MCNC: 13.2 GM/DL (ref 12–16)
HGB UR QL STRIP: ABNORMAL
IMM GRANULOCYTES # BLD AUTO: 0.02 10*3/UL (ref 0–0.02)
IMM GRANULOCYTES NFR BLD AUTO: 0.3 % (ref 0–0.43)
KETONES UR QL STRIP: NEGATIVE
LDLC SERPL CALC-MCNC: 73 MG/DL (ref 50–140)
LEUKOCYTE ESTERASE UR QL STRIP: NEGATIVE
LYMPHOCYTES # BLD AUTO: 2.03 X10(3)/MCL (ref 0.6–4.6)
LYMPHOCYTES NFR BLD AUTO: 25.5 % (ref 16–44)
MCH RBC QN AUTO: 28.3 PG (ref 27–31)
MCHC RBC AUTO-ENTMCNC: 32.2 GM/DL (ref 33–36)
MCV RBC AUTO: 88 FL (ref 80–94)
MICROALBUMIN UR-MCNC: 84.4 UG/ML
MICROALBUMIN/CREAT RATIO PNL UR: 95.3 MG/GM CR (ref 0–30)
MONOCYTES # BLD AUTO: 0.52 X10(3)/MCL (ref 0.1–1.3)
MONOCYTES NFR BLD AUTO: 6.5 % (ref 4–12.1)
MUCOUS THREADS URNS QL MICRO: ABNORMAL /LPF
NEUTROPHILS # BLD AUTO: 5.06 X10(3)/MCL (ref 2.1–9.2)
NEUTROPHILS NFR BLD AUTO: 63.5 % (ref 43–73)
NITRITE UR QL STRIP: NEGATIVE
NRBC BLD AUTO-RTO: 0 % (ref 0–0.2)
PH UR STRIP: 6 [PH] (ref 5–7)
PLATELET # BLD AUTO: 335 X10(3)/MCL (ref 130–400)
PMV BLD AUTO: 8.7 FL (ref 7.4–10.4)
POTASSIUM SERPL-SCNC: 4 MMOL/L (ref 3.5–5.1)
PROT SERPL-MCNC: 7 GM/DL (ref 5.8–7.6)
PROT UR QL STRIP: ABNORMAL
RBC # BLD AUTO: 4.66 X10(6)/MCL (ref 4.2–5.4)
RBC #/AREA URNS HPF: ABNORMAL /HPF
SODIUM SERPL-SCNC: 143 MMOL/L (ref 136–145)
SP GR UR STRIP: 1.02 (ref 1–1.03)
SQUAMOUS EPITHELIAL, UA: ABNORMAL /LPF
TRIGL SERPL-MCNC: 70 MG/DL (ref 0–150)
UROBILINOGEN UR STRIP-ACNC: NEGATIVE
VLDLC SERPL CALC-MCNC: 14 MG/DL
WBC # SPEC AUTO: 8 X10(3)/MCL (ref 4.5–11.5)
WBC #/AREA URNS HPF: ABNORMAL /HPF

## 2020-10-05 ENCOUNTER — HISTORICAL (OUTPATIENT)
Dept: LAB | Facility: HOSPITAL | Age: 75
End: 2020-10-05

## 2020-10-05 LAB
EST. AVERAGE GLUCOSE BLD GHB EST-MCNC: 154.2 MG/DL
HBA1C MFR BLD: 7 %

## 2021-01-21 ENCOUNTER — HISTORICAL (OUTPATIENT)
Dept: LAB | Facility: HOSPITAL | Age: 76
End: 2021-01-21

## 2021-01-21 LAB
BUN SERPL-MCNC: 14.4 MG/DL (ref 9.8–20.1)
CALCIUM SERPL-MCNC: 10.4 MG/DL (ref 8.4–10.2)
CHLORIDE SERPL-SCNC: 98 MMOL/L (ref 98–107)
CO2 SERPL-SCNC: 23 MMOL/L (ref 23–31)
CREAT SERPL-MCNC: 0.99 MG/DL (ref 0.57–1.11)
CREAT/UREA NIT SERPL: 15
GLUCOSE SERPL-MCNC: 144 MG/DL (ref 82–115)
POTASSIUM SERPL-SCNC: 3.9 MMOL/L (ref 3.5–5.1)
SODIUM SERPL-SCNC: 138 MMOL/L (ref 136–145)

## 2021-03-22 ENCOUNTER — HISTORICAL (OUTPATIENT)
Dept: LAB | Facility: HOSPITAL | Age: 76
End: 2021-03-22

## 2021-03-22 LAB
ABS NEUT (OLG): 4.63 X10(3)/MCL (ref 2.1–9.2)
ALBUMIN SERPL-MCNC: 4.2 GM/DL (ref 3.4–4.8)
ALBUMIN/GLOB SERPL: 1.4 RATIO (ref 1.1–2)
ALP SERPL-CCNC: 83 UNIT/L (ref 40–150)
ALT SERPL-CCNC: 21 UNIT/L (ref 0–55)
AST SERPL-CCNC: 19 UNIT/L (ref 5–34)
BASOPHILS # BLD AUTO: 0.07 X10(3)/MCL (ref 0–0.2)
BASOPHILS NFR BLD AUTO: 1 % (ref 0–1)
BILIRUB SERPL-MCNC: 1 MG/DL (ref 0.2–1.2)
BILIRUBIN DIRECT+TOT PNL SERPL-MCNC: 0.4 MG/DL (ref 0–0.5)
BILIRUBIN DIRECT+TOT PNL SERPL-MCNC: 0.6 MG/DL (ref 0–0.8)
BUN SERPL-MCNC: 11.3 MG/DL (ref 9.8–20.1)
CALCIUM SERPL-MCNC: 9.9 MG/DL (ref 8.4–10.2)
CHLORIDE SERPL-SCNC: 102 MMOL/L (ref 98–107)
CHOLEST SERPL-MCNC: 159 MG/DL
CHOLEST/HDLC SERPL: 2 {RATIO} (ref 0–5)
CO2 SERPL-SCNC: 28 MMOL/L (ref 23–31)
CREAT SERPL-MCNC: 0.97 MG/DL (ref 0.57–1.11)
CREAT UR-MCNC: 50.4 MG/DL (ref 45–106)
EOSINOPHIL # BLD AUTO: 0.21 X10(3)/MCL (ref 0–0.9)
EOSINOPHIL NFR BLD AUTO: 2.9 % (ref 0–6.4)
ERYTHROCYTE [DISTWIDTH] IN BLOOD BY AUTOMATED COUNT: 12.7 % (ref 11.5–17)
EST. AVERAGE GLUCOSE BLD GHB EST-MCNC: 168.6 MG/DL
GLOBULIN SER-MCNC: 3.1 GM/DL (ref 2.4–3.5)
GLUCOSE SERPL-MCNC: 194 MG/DL (ref 82–115)
HBA1C MFR BLD: 7.5 %
HCT VFR BLD AUTO: 43.1 % (ref 37–47)
HDLC SERPL-MCNC: 68 MG/DL (ref 40–60)
HGB BLD-MCNC: 14.2 GM/DL (ref 12–16)
IMM GRANULOCYTES # BLD AUTO: 0.01 10*3/UL (ref 0–0.02)
IMM GRANULOCYTES NFR BLD AUTO: 0.1 % (ref 0–0.43)
LDLC SERPL CALC-MCNC: 68 MG/DL (ref 50–140)
LYMPHOCYTES # BLD AUTO: 1.96 X10(3)/MCL (ref 0.6–4.6)
LYMPHOCYTES NFR BLD AUTO: 26.9 % (ref 16–44)
MCH RBC QN AUTO: 28.7 PG (ref 27–31)
MCHC RBC AUTO-ENTMCNC: 32.9 GM/DL (ref 33–36)
MCV RBC AUTO: 87.1 FL (ref 80–94)
MICROALBUMIN UR-MCNC: 536.9 UG/ML
MICROALBUMIN/CREAT RATIO PNL UR: 1065.3 MG/GM CR (ref 0–30)
MONOCYTES # BLD AUTO: 0.41 X10(3)/MCL (ref 0.1–1.3)
MONOCYTES NFR BLD AUTO: 5.6 % (ref 4–12.1)
NEUTROPHILS # BLD AUTO: 4.63 X10(3)/MCL (ref 2.1–9.2)
NEUTROPHILS NFR BLD AUTO: 63.5 % (ref 43–73)
NRBC BLD AUTO-RTO: 0 % (ref 0–0.2)
PLATELET # BLD AUTO: 351 X10(3)/MCL (ref 130–400)
PMV BLD AUTO: 8.2 FL (ref 7.4–10.4)
POTASSIUM SERPL-SCNC: 3.6 MMOL/L (ref 3.5–5.1)
PROT SERPL-MCNC: 7.3 GM/DL (ref 5.8–7.6)
RBC # BLD AUTO: 4.95 X10(6)/MCL (ref 4.2–5.4)
SODIUM SERPL-SCNC: 140 MMOL/L (ref 136–145)
TRIGL SERPL-MCNC: 116 MG/DL (ref 0–150)
TSH SERPL-ACNC: 1.1 UIU/ML (ref 0.35–4.94)
VLDLC SERPL CALC-MCNC: 23 MG/DL
WBC # SPEC AUTO: 7.3 X10(3)/MCL (ref 4.5–11.5)

## 2021-07-12 ENCOUNTER — HISTORICAL (OUTPATIENT)
Dept: LAB | Facility: HOSPITAL | Age: 76
End: 2021-07-12

## 2021-07-12 LAB
EST. AVERAGE GLUCOSE BLD GHB EST-MCNC: 159.9 MG/DL
HBA1C MFR BLD: 7.2 %

## 2022-02-19 ENCOUNTER — HISTORICAL (OUTPATIENT)
Dept: LAB | Facility: HOSPITAL | Age: 77
End: 2022-02-19

## 2022-02-19 LAB
ABS NEUT (OLG): 5.79 (ref 2.1–9.2)
ALBUMIN SERPL-MCNC: 4.2 G/DL (ref 3.4–4.8)
ALBUMIN/GLOB SERPL: 1.2 {RATIO} (ref 1.1–2)
ALP SERPL-CCNC: 78 U/L (ref 40–150)
ALT SERPL-CCNC: 29 U/L (ref 0–55)
AST SERPL-CCNC: 24 U/L (ref 5–34)
BASOPHILS # BLD AUTO: 0.07 10*3/UL (ref 0–0.2)
BASOPHILS NFR BLD AUTO: 0.8 % (ref 0–1)
BILIRUB SERPL-MCNC: 0.7 MG/DL (ref 0.2–1.2)
BILIRUBIN DIRECT+TOT PNL SERPL-MCNC: 0.3 (ref 0–0.5)
BILIRUBIN DIRECT+TOT PNL SERPL-MCNC: 0.4 (ref 0–0.8)
BUN SERPL-MCNC: 12.8 MG/DL (ref 9.8–20.1)
CALCIUM SERPL-MCNC: 10.1 MG/DL (ref 8.4–10.2)
CHLORIDE SERPL-SCNC: 100 MMOL/L (ref 98–107)
CHOLEST SERPL-MCNC: 169 MG/DL
CHOLEST/HDLC SERPL: 2 {RATIO} (ref 0–5)
CO2 SERPL-SCNC: 30 MMOL/L (ref 23–31)
CREAT SERPL-MCNC: 1.02 MG/DL (ref 0.57–1.11)
CREAT UR-MCNC: 30.8 MG/DL (ref 45–106)
EOSINOPHIL # BLD AUTO: 0.33 10*3/UL (ref 0–0.9)
EOSINOPHIL NFR BLD AUTO: 3.8 % (ref 0–6.4)
ERYTHROCYTE [DISTWIDTH] IN BLOOD BY AUTOMATED COUNT: 12.3 % (ref 11.5–17)
EST. AVERAGE GLUCOSE BLD GHB EST-MCNC: 180 MG/DL
GLOBULIN SER-MCNC: 3.5 G/DL (ref 2.4–3.5)
GLUCOSE SERPL-MCNC: 253 MG/DL (ref 82–115)
HBA1C MFR BLD: 7.9 %
HCT VFR BLD AUTO: 41.7 % (ref 37–47)
HDLC SERPL-MCNC: 77 MG/DL (ref 40–60)
HEMOLYSIS INTERF INDEX SERPL-ACNC: 5
HGB BLD-MCNC: 13.6 G/DL (ref 12–16)
ICTERIC INTERF INDEX SERPL-ACNC: 1
IMM GRANULOCYTES # BLD AUTO: 0.01 10*3/UL (ref 0–0.02)
IMM GRANULOCYTES NFR BLD AUTO: 0.1 % (ref 0–0.43)
LDLC SERPL CALC-MCNC: 74 MG/DL (ref 50–140)
LIPEMIC INTERF INDEX SERPL-ACNC: 2
LYMPHOCYTES # BLD AUTO: 1.96 10*3/UL (ref 0.6–4.6)
LYMPHOCYTES NFR BLD AUTO: 22.5 % (ref 16–44)
MANUAL DIFF? (OHS): NO
MCH RBC QN AUTO: 29.1 PG (ref 27–31)
MCHC RBC AUTO-ENTMCNC: 32.6 G/DL (ref 33–36)
MCV RBC AUTO: 89.3 FL (ref 80–94)
MICROALBUMIN UR-MCNC: 39.1
MICROALBUMIN/CREAT RATIO PNL UR: 126.9 (ref 0–30)
MONOCYTES # BLD AUTO: 0.57 10*3/UL (ref 0.1–1.3)
MONOCYTES NFR BLD AUTO: 6.5 % (ref 4–12.1)
NEUTROPHILS # BLD AUTO: 5.79 10*3/UL (ref 2.1–9.2)
NEUTROPHILS NFR BLD AUTO: 66.3 % (ref 43–73)
NRBC BLD AUTO-RTO: 0 % (ref 0–0.2)
PLATELET # BLD AUTO: 335 10*3/UL (ref 130–400)
PMV BLD AUTO: 8.4 FL (ref 7.4–10.4)
POTASSIUM SERPL-SCNC: 3.8 MMOL/L (ref 3.5–5.1)
PROT SERPL-MCNC: 7.7 G/DL (ref 5.8–7.6)
RBC # BLD AUTO: 4.67 10*6/UL (ref 4.2–5.4)
SODIUM SERPL-SCNC: 141 MMOL/L (ref 136–145)
T4 FREE SERPL-MCNC: 1.36 NG/DL (ref 0.7–1.48)
TRIGL SERPL-MCNC: 91 MG/DL (ref 0–150)
TSH SERPL-ACNC: 0.52 M[IU]/L (ref 0.35–4.94)
VLDLC SERPL CALC-MCNC: 18 MG/DL
WBC # SPEC AUTO: 8.7 10*3/UL (ref 4.5–11.5)

## 2022-03-10 ENCOUNTER — HISTORICAL (OUTPATIENT)
Dept: LAB | Facility: HOSPITAL | Age: 77
End: 2022-03-10

## 2022-03-10 LAB
BUN SERPL-MCNC: 16.9 MG/DL (ref 9.8–20.1)
CALCIUM SERPL-MCNC: 9.9 MG/DL (ref 8.4–10.2)
CHLORIDE SERPL-SCNC: 97 MMOL/L (ref 98–107)
CO2 SERPL-SCNC: 27 MMOL/L (ref 23–31)
CREAT SERPL-MCNC: 1.06 MG/DL (ref 0.57–1.11)
CREAT/UREA NIT SERPL: 16
GLUCOSE SERPL-MCNC: 125 MG/DL (ref 82–115)
HEMOLYSIS INTERF INDEX SERPL-ACNC: 4
ICTERIC INTERF INDEX SERPL-ACNC: 1
LIPEMIC INTERF INDEX SERPL-ACNC: 4
POTASSIUM SERPL-SCNC: 3.6 MMOL/L (ref 3.5–5.1)
SODIUM SERPL-SCNC: 134 MMOL/L (ref 136–145)

## 2022-04-10 ENCOUNTER — HISTORICAL (OUTPATIENT)
Dept: ADMINISTRATIVE | Facility: HOSPITAL | Age: 77
End: 2022-04-10
Payer: MEDICARE

## 2022-04-29 VITALS
WEIGHT: 121.13 LBS | OXYGEN SATURATION: 99 % | DIASTOLIC BLOOD PRESSURE: 80 MMHG | SYSTOLIC BLOOD PRESSURE: 134 MMHG | BODY MASS INDEX: 21.46 KG/M2 | HEIGHT: 63 IN

## 2022-07-11 ENCOUNTER — LAB VISIT (OUTPATIENT)
Dept: LAB | Facility: HOSPITAL | Age: 77
End: 2022-07-11
Attending: INTERNAL MEDICINE
Payer: MEDICARE

## 2022-07-11 DIAGNOSIS — E03.9 HYPOTHYROIDISM, UNSPECIFIED TYPE: ICD-10-CM

## 2022-07-11 DIAGNOSIS — E78.5 HYPERLIPIDEMIA, UNSPECIFIED HYPERLIPIDEMIA TYPE: ICD-10-CM

## 2022-07-11 DIAGNOSIS — E11.65 UNCONTROLLED TYPE 2 DIABETES MELLITUS WITH HYPERGLYCEMIA: ICD-10-CM

## 2022-07-11 DIAGNOSIS — I10 HYPERTENSION, UNSPECIFIED TYPE: ICD-10-CM

## 2022-07-11 DIAGNOSIS — Z00.00 WELLNESS EXAMINATION: Primary | ICD-10-CM

## 2022-07-11 LAB
ALBUMIN SERPL-MCNC: 4.1 GM/DL (ref 3.4–4.8)
ALBUMIN/GLOB SERPL: 1.3 RATIO (ref 1.1–2)
ALP SERPL-CCNC: 73 UNIT/L (ref 40–150)
ALT SERPL-CCNC: 21 UNIT/L (ref 0–55)
AST SERPL-CCNC: 17 UNIT/L (ref 5–34)
BASOPHILS # BLD AUTO: 0.06 X10(3)/MCL (ref 0–0.2)
BASOPHILS NFR BLD AUTO: 0.6 %
BILIRUBIN DIRECT+TOT PNL SERPL-MCNC: 0.8 MG/DL
BUN SERPL-MCNC: 12.1 MG/DL (ref 9.8–20.1)
CALCIUM SERPL-MCNC: 9.9 MG/DL (ref 8.4–10.2)
CHLORIDE SERPL-SCNC: 108 MMOL/L (ref 98–107)
CHOLEST SERPL-MCNC: 140 MG/DL
CHOLEST/HDLC SERPL: 2 {RATIO} (ref 0–5)
CO2 SERPL-SCNC: 26 MMOL/L (ref 23–31)
CREAT SERPL-MCNC: 0.97 MG/DL (ref 0.55–1.02)
EOSINOPHIL # BLD AUTO: 0.16 X10(3)/MCL (ref 0–0.9)
EOSINOPHIL NFR BLD AUTO: 1.6 %
ERYTHROCYTE [DISTWIDTH] IN BLOOD BY AUTOMATED COUNT: 12.4 % (ref 11.5–17)
EST. AVERAGE GLUCOSE BLD GHB EST-MCNC: 168.6 MG/DL
GLOBULIN SER-MCNC: 3.2 GM/DL (ref 2.4–3.5)
GLUCOSE SERPL-MCNC: 174 MG/DL (ref 82–115)
HBA1C MFR BLD: 7.5 %
HCT VFR BLD AUTO: 43.1 % (ref 37–47)
HDLC SERPL-MCNC: 58 MG/DL (ref 35–60)
HGB BLD-MCNC: 13.9 GM/DL (ref 12–16)
IMM GRANULOCYTES # BLD AUTO: 0.03 X10(3)/MCL (ref 0–0.04)
IMM GRANULOCYTES NFR BLD AUTO: 0.3 %
LDLC SERPL CALC-MCNC: 64 MG/DL (ref 50–140)
LYMPHOCYTES # BLD AUTO: 1.59 X10(3)/MCL (ref 0.6–4.6)
LYMPHOCYTES NFR BLD AUTO: 16 %
MCH RBC QN AUTO: 29.1 PG (ref 27–31)
MCHC RBC AUTO-ENTMCNC: 32.3 MG/DL (ref 33–36)
MCV RBC AUTO: 90.4 FL (ref 80–94)
MONOCYTES # BLD AUTO: 0.58 X10(3)/MCL (ref 0.1–1.3)
MONOCYTES NFR BLD AUTO: 5.8 %
NEUTROPHILS # BLD AUTO: 7.5 X10(3)/MCL (ref 2.1–9.2)
NEUTROPHILS NFR BLD AUTO: 75.7 %
NRBC BLD AUTO-RTO: 0 %
PLATELET # BLD AUTO: 319 X10(3)/MCL (ref 130–400)
PMV BLD AUTO: 8.5 FL (ref 7.4–10.4)
POTASSIUM SERPL-SCNC: 3.8 MMOL/L (ref 3.5–5.1)
PROT SERPL-MCNC: 7.3 GM/DL (ref 5.8–7.6)
RBC # BLD AUTO: 4.77 X10(6)/MCL (ref 4.2–5.4)
SODIUM SERPL-SCNC: 145 MMOL/L (ref 136–145)
TRIGL SERPL-MCNC: 88 MG/DL (ref 37–140)
TSH SERPL-ACNC: 0.27 UIU/ML (ref 0.35–4.94)
VLDLC SERPL CALC-MCNC: 18 MG/DL
WBC # SPEC AUTO: 10 X10(3)/MCL (ref 4.5–11.5)

## 2022-07-11 PROCEDURE — 80053 COMPREHEN METABOLIC PANEL: CPT

## 2022-07-11 PROCEDURE — 36415 COLL VENOUS BLD VENIPUNCTURE: CPT

## 2022-07-11 PROCEDURE — 85025 COMPLETE CBC W/AUTO DIFF WBC: CPT

## 2022-07-11 PROCEDURE — 83036 HEMOGLOBIN GLYCOSYLATED A1C: CPT

## 2022-07-11 PROCEDURE — 84443 ASSAY THYROID STIM HORMONE: CPT

## 2022-07-11 PROCEDURE — 80061 LIPID PANEL: CPT

## 2022-07-19 ENCOUNTER — OFFICE VISIT (OUTPATIENT)
Dept: FAMILY MEDICINE | Facility: CLINIC | Age: 77
End: 2022-07-19
Payer: MEDICARE

## 2022-07-19 VITALS
SYSTOLIC BLOOD PRESSURE: 138 MMHG | WEIGHT: 103.5 LBS | HEIGHT: 62 IN | DIASTOLIC BLOOD PRESSURE: 77 MMHG | OXYGEN SATURATION: 98 % | TEMPERATURE: 98 F | BODY MASS INDEX: 19.05 KG/M2 | RESPIRATION RATE: 18 BRPM

## 2022-07-19 DIAGNOSIS — M85.80 OSTEOPENIA, UNSPECIFIED LOCATION: ICD-10-CM

## 2022-07-19 DIAGNOSIS — L98.9 SKIN LESION: ICD-10-CM

## 2022-07-19 DIAGNOSIS — I15.2 HYPERTENSION ASSOCIATED WITH TYPE 2 DIABETES MELLITUS: ICD-10-CM

## 2022-07-19 DIAGNOSIS — E03.9 HYPOTHYROIDISM, UNSPECIFIED TYPE: ICD-10-CM

## 2022-07-19 DIAGNOSIS — E78.5 HYPERLIPIDEMIA ASSOCIATED WITH TYPE 2 DIABETES MELLITUS: ICD-10-CM

## 2022-07-19 DIAGNOSIS — N18.31 CHRONIC KIDNEY DISEASE, STAGE 3A: ICD-10-CM

## 2022-07-19 DIAGNOSIS — E11.65 TYPE 2 DIABETES MELLITUS WITH HYPERGLYCEMIA, WITHOUT LONG-TERM CURRENT USE OF INSULIN: ICD-10-CM

## 2022-07-19 DIAGNOSIS — Z00.00 WELLNESS EXAMINATION: Primary | ICD-10-CM

## 2022-07-19 DIAGNOSIS — E11.69 HYPERLIPIDEMIA ASSOCIATED WITH TYPE 2 DIABETES MELLITUS: ICD-10-CM

## 2022-07-19 DIAGNOSIS — E11.59 HYPERTENSION ASSOCIATED WITH TYPE 2 DIABETES MELLITUS: ICD-10-CM

## 2022-07-19 PROBLEM — I10 HYPERTENSION: Status: ACTIVE | Noted: 2022-07-19

## 2022-07-19 PROCEDURE — G0439 PR MEDICARE ANNUAL WELLNESS SUBSEQUENT VISIT: ICD-10-PCS | Mod: ,,, | Performed by: INTERNAL MEDICINE

## 2022-07-19 PROCEDURE — G0439 PPPS, SUBSEQ VISIT: HCPCS | Mod: ,,, | Performed by: INTERNAL MEDICINE

## 2022-07-19 RX ORDER — LEVOTHYROXINE SODIUM 50 UG/1
50 TABLET ORAL
Qty: 30 TABLET | Refills: 11 | Status: SHIPPED | OUTPATIENT
Start: 2022-07-19 | End: 2023-07-24

## 2022-07-19 RX ORDER — DOXAZOSIN 2 MG/1
2 TABLET ORAL DAILY
COMMUNITY
Start: 2022-04-13 | End: 2023-02-17

## 2022-07-19 RX ORDER — LEVOTHYROXINE SODIUM 75 UG/1
75 TABLET ORAL DAILY
COMMUNITY
Start: 2022-06-26 | End: 2022-07-19

## 2022-07-19 RX ORDER — EMPAGLIFLOZIN 25 MG/1
25 TABLET, FILM COATED ORAL EVERY MORNING
COMMUNITY
Start: 2022-05-25 | End: 2023-03-01

## 2022-07-19 RX ORDER — GLIPIZIDE 2.5 MG/1
2.5 TABLET, EXTENDED RELEASE ORAL
COMMUNITY
Start: 2022-02-28 | End: 2023-01-18

## 2022-07-19 RX ORDER — BENAZEPRIL HYDROCHLORIDE 40 MG/1
40 TABLET ORAL DAILY
COMMUNITY
Start: 2022-05-27 | End: 2023-02-27

## 2022-07-19 RX ORDER — ATORVASTATIN CALCIUM 40 MG/1
40 TABLET, FILM COATED ORAL DAILY
COMMUNITY
Start: 2022-07-17 | End: 2023-04-10

## 2022-07-19 RX ORDER — CALCIUM CARBONATE 600 MG
600 TABLET ORAL DAILY
COMMUNITY
Start: 2022-01-19 | End: 2023-09-12 | Stop reason: ALTCHOICE

## 2022-07-19 RX ORDER — HYDROCHLOROTHIAZIDE 25 MG/1
25 TABLET ORAL DAILY
COMMUNITY
Start: 2022-05-16 | End: 2023-03-01

## 2022-07-19 RX ORDER — DULAGLUTIDE 3 MG/.5ML
3 INJECTION, SOLUTION SUBCUTANEOUS WEEKLY
COMMUNITY
Start: 2022-07-08 | End: 2022-10-04 | Stop reason: SDUPTHER

## 2022-07-19 RX ORDER — METOPROLOL SUCCINATE 25 MG/1
25 TABLET, EXTENDED RELEASE ORAL DAILY
COMMUNITY
Start: 2022-01-19 | End: 2022-07-19 | Stop reason: SDUPTHER

## 2022-07-19 RX ORDER — AMLODIPINE BESYLATE 10 MG/1
10 TABLET ORAL DAILY
COMMUNITY
Start: 2022-05-03 | End: 2023-01-26

## 2022-07-19 NOTE — PROGRESS NOTES
Patient ID: [id number]     Chief Complaint: Medicare AWV (With completed labs )      HPI:     Christina Car is a 77 y.o. female here today for a Medicare Wellness. No other complaints today.     Chronic Medical Conditions:    Type 2 DM with hyperglycemia:  Current med: Metformin 1000 mg daily+ Jardiance 25 mg daily + Trulicity 1.5 mg sub Q weekly + Glipizide PRN only  She is not checking her FBG  Last A1c 7.5 (from 7.9)  Diet is high in rice/sweets, she tries but has trouble staying consistent  Due for eye exam Winter 2022- Dr. Wyman   Foot exam: 7/19/2022 no DN    HTN and HLD associated with Type 2 DM:  BP uncontrolled- her home BP log shows very low readings  compliant with statin, no myalgias    Hypothyroidism:  Compliant with medication  TSH is low    CKD stage 3a: GFR 59  She avoids NSAID  Drink plenty of water    Osteopenia: compliant with vitamin D/Calcium      DEXA for this year 8/9/22   MMG 8/9/22        No past medical history on file.     History reviewed. No pertinent surgical history.    Review of patient's allergies indicates:  No Known Allergies    Outpatient Medications Marked as Taking for the 7/19/22 encounter (Office Visit) with Alfie Mendiola MD   Medication Sig Dispense Refill    amLODIPine (NORVASC) 10 MG tablet Take 10 mg by mouth once daily.      atorvastatin (LIPITOR) 40 MG tablet Take 40 mg by mouth once daily.      benazepriL (LOTENSIN) 40 MG tablet Take 40 mg by mouth once daily.      calcium carbonate (OS-MEKA) 600 mg calcium (1,500 mg) Tab Take 600 mg by mouth once daily.      doxazosin (CARDURA) 2 MG tablet Take 2 mg by mouth once daily.      glipiZIDE (GLUCOTROL) 2.5 MG TR24 Take 2.5 mg by mouth as needed.      hydroCHLOROthiazide (HYDRODIURIL) 25 MG tablet Take 25 mg by mouth once daily.      JARDIANCE 25 mg tablet Take 25 mg by mouth every morning.      metFORMIN (GLUCOPHAGE) 1000 MG tablet Take 1 tablet by mouth twice daily 180 tablet 0    metoprolol succinate  (TOPROL-XL) 25 MG 24 hr tablet Take 1 tablet by mouth once daily 90 tablet 0    TRULICITY 3 mg/0.5 mL pen injector Inject 3 mg into the skin once a week.      [DISCONTINUED] levothyroxine (SYNTHROID) 75 MCG tablet Take 75 mcg by mouth once daily.         Social History     Socioeconomic History    Marital status:    Tobacco Use    Smoking status: Never Smoker    Smokeless tobacco: Never Used   Substance and Sexual Activity    Drug use: Never        History reviewed. No pertinent family history.     Patient Care Team:  Alfie Mendiola MD as PCP - General (Internal Medicine)  Alfie Mendiola MD       Subjective:     Review of Systems   Constitutional: Negative for chills, fever and weight loss.   HENT: Negative for hearing loss.    Eyes: Negative for blurred vision.   Cardiovascular: Negative for chest pain.   Gastrointestinal: Negative for abdominal pain and nausea.   Genitourinary: Negative for dysuria.   Neurological: Negative for dizziness.         Patient Reported Health Risk Assessment  What [age] 70-79  Are you male or female?: Female  During the past four weeks, how much have you been bothered by emotional problems such as feeling anxious, depressed, irritable, sad, or downhearted and blue?: Not at all  During the past five weeks, has your physical and/or emotional health limited your social activities with family, friends, neighbors, or groups?: Not at all  During the past four weeks, how much bodily pain have you generally had?: No pain  During the past four weeks, was someone available to help if you needed and wanted help?: Yes, as much as I wanted  During the past four weeks, what was the hardest physical activity you could do for at least two minutes?: Moderate  Can you get to places out of walking distance without help?  (For example, can you travel alone on buses or taxis, or drive your own car?): Yes  Can you go shopping for groceries or clothes without someone's help?: Yes  Can  you prepare your own meals?: Yes  Can you do your own housework without help?: Yes  Because of any health problems, do you need the help of another person with your personal care needs such as eating, bathing, dressing, or getting around the house?: No  Can you handle your own money without help?: No  During the past four weeks, how would you rate your health in general?: Very good  How have things been going for you during the past four weeks?: Pretty well  Are you having difficulties driving your car?: No  Do you always fasten your seat belt when you are in a car?: Yes, usually  How often in the past four weeks have you been bothered by falling or dizzy when standing up?: Never  How often in the past four weeks have you been bothered by sexual problems?: Never  How often in the past four weeks have you been bothered by trouble eating well?: Never  How often in the past four weeks have you been bothered by teeth or denture problems?: Never  How often in the past four weeks have you been bothered with problems using the telephone?: Never  How often in the past four weeks have you been bothered by tiredness or fatigue?: Never  Have you fallen two or more times in the past year?: No  Are you afraid of falling?: Yes  Are you a smoker?: No  During the past four weeks, how many drinks of wine, beer, or other alcoholic beverages did you have?: No alcohol at all  Do you exercise for about 20 minutes three or more days a week?: No, I usually do not exercise this much  Have you been given any information to help you with hazards in your house that might hurt you?: No  Have you been given any information to help you with keeping track of your medications?: No  How often do you have trouble taking medicines the way you've been told to take them?: I always take them as prescribed  How confident are you that you can control and manage most of your health problems?: Somewhat confident    Objective:     /77   Temp 98.2 °F  "(36.8 °C) (Oral)   Resp 18   Ht 5' 2" (1.575 m)   Wt 46.9 kg (103 lb 8 oz)   SpO2 98%   BMI 18.93 kg/m²     Physical Exam  Vitals and nursing note reviewed.   Constitutional:       General: She is not in acute distress.     Appearance: She is well-developed. She is not diaphoretic.   HENT:      Head: Normocephalic and atraumatic.   Eyes:      General:         Right eye: No discharge.         Left eye: No discharge.      Conjunctiva/sclera: Conjunctivae normal.      Pupils: Pupils are equal, round, and reactive to light.   Neck:      Thyroid: No thyromegaly.   Cardiovascular:      Rate and Rhythm: Normal rate and regular rhythm.      Heart sounds: Normal heart sounds. No murmur heard.  Pulmonary:      Effort: Pulmonary effort is normal. No respiratory distress.      Breath sounds: Normal breath sounds. No wheezing or rales.   Musculoskeletal:      Cervical back: Normal range of motion and neck supple.   Lymphadenopathy:      Cervical: No cervical adenopathy.   Skin:     General: Skin is warm and dry.   Neurological:      Mental Status: She is alert and oriented to person, place, and time.   Psychiatric:         Mood and Affect: Mood normal.         Behavior: Behavior normal.       Protective Sensation (w/ 10 gram monofilament):  Right: Intact  Left: Intact    Visual Inspection:  Normal -  Bilateral  Some thickened toe nails- continue otc lamisil bid x 6 months  Pedal Pulses:   Right: Present  Left: Present    Posterior tibialis:   Right:Present  Left: Present        No flowsheet data found.  Fall Risk Assessment - Outpatient 7/19/2022   Mobility Status Ambulatory   Number of falls 0   Identified as fall risk 0              Assessment:       ICD-10-CM ICD-9-CM   1. Wellness examination  Z00.00 V70.0   2. Type 2 diabetes mellitus with hyperglycemia, without long-term current use of insulin  E11.65 250.00     790.29   3. Hyperlipidemia associated with type 2 diabetes mellitus  E11.69 250.80    E78.5 272.4   4. " Hypertension associated with type 2 diabetes mellitus  E11.59 250.80    I15.2 401.9   5. Hypothyroidism, unspecified type  E03.9 244.9   6. Osteopenia, unspecified location  M85.80 733.90   7. Chronic kidney disease, stage 3a  N18.31 585.3        Plan:     Medicare Annual Wellness and Personalized Prevention Plan:   Fall Risk + Home Safety + Hearing Impairment + Depression Screen + Cognitive Impairment Screen + Health Risk Assessment all reviewed.       Health Maintenance Topics with due status: Not Due       Topic Last Completion Date    TETANUS VACCINE 05/19/2019    DEXA Scan 11/01/2019    Influenza Vaccine 10/08/2021    Eye Exam 01/12/2022    Diabetes Urine Screening 02/19/2022    Lipid Panel 07/11/2022    Hemoglobin A1c 07/11/2022      The patient's Health Maintenance was reviewed and the following appears to be due at this time:   Health Maintenance Due   Topic Date Due    Hepatitis C Screening  Never done    Foot Exam  Never done     Wellness:  Stable, up to date  Mammogram and dexa are scheduled  Type 2 DM with hyperglycemia, HTN, HLD, CKD 3  A1c is improved but still not at goal  Reminded patient importance of the ADA diet  Check FBG daily- restart this  We discussed smaller portions of her carbs  If still elevated, will likely add actos to her medication regimen  BP is elevated, will need to have patient bring in home BP machine to see why her values are so different- schedule 2 week nurse BP follow up visit  Continue statin therapy  Continue BP Rx   Avoid nsaids and cox2 inhibitors, drink more water  TSH is low, reduce levothyroxine dose, will repeat in 3 mos  DEXA due this year     Advance Care Planning   I attest to discussing Advance Care Planning with patient and/or family member.  Education was provided including the importance of the Health Care Power of , Advance Directives  The patient expressed understanding to the importance of this information and discussion. She will discuss this with  her family and provide us information  Length of ACP conversation in minutes: 10         Medication List with Changes/Refills   New Medications    LEVOTHYROXINE (SYNTHROID) 50 MCG TABLET    Take 1 tablet (50 mcg total) by mouth before breakfast.       Start Date: 7/19/2022 End Date: 7/19/2023   Current Medications    AMLODIPINE (NORVASC) 10 MG TABLET    Take 10 mg by mouth once daily.       Start Date: 5/3/2022  End Date: --    ATORVASTATIN (LIPITOR) 40 MG TABLET    Take 40 mg by mouth once daily.       Start Date: 7/17/2022 End Date: --    BENAZEPRIL (LOTENSIN) 40 MG TABLET    Take 40 mg by mouth once daily.       Start Date: 5/27/2022 End Date: --    CALCIUM CARBONATE (OS-MEKA) 600 MG CALCIUM (1,500 MG) TAB    Take 600 mg by mouth once daily.       Start Date: 1/19/2022 End Date: --    DOXAZOSIN (CARDURA) 2 MG TABLET    Take 2 mg by mouth once daily.       Start Date: 4/13/2022 End Date: --    GLIPIZIDE (GLUCOTROL) 2.5 MG TR24    Take 2.5 mg by mouth as needed.       Start Date: 2/28/2022 End Date: --    HYDROCHLOROTHIAZIDE (HYDRODIURIL) 25 MG TABLET    Take 25 mg by mouth once daily.       Start Date: 5/16/2022 End Date: --    JARDIANCE 25 MG TABLET    Take 25 mg by mouth every morning.       Start Date: 5/25/2022 End Date: --    METFORMIN (GLUCOPHAGE) 1000 MG TABLET    Take 1 tablet by mouth twice daily       Start Date: 5/24/2022 End Date: --    METOPROLOL SUCCINATE (TOPROL-XL) 25 MG 24 HR TABLET    Take 1 tablet by mouth once daily       Start Date: 5/27/2022 End Date: --    TRULICITY 3 MG/0.5 ML PEN INJECTOR    Inject 3 mg into the skin once a week.       Start Date: 7/8/2022  End Date: --   Discontinued Medications    LEVOTHYROXINE (SYNTHROID) 75 MCG TABLET    Take 75 mcg by mouth once daily.       Start Date: 6/26/2022 End Date: 7/19/2022    METOPROLOL SUCCINATE (TOPROL-XL) 25 MG 24 HR TABLET    Take 25 tablets by mouth once daily.       Start Date: 1/19/2022 End Date: 7/19/2022        Follow up in about 6  months (around 1/19/2023) for chronic medical conditions follow up. In addition to their scheduled follow up, the patient has also been instructed to follow up on as needed basis.

## 2022-07-19 NOTE — PROGRESS NOTES
Subjective:      Patient ID: Christina Car is a 77 y.o. female.    Chief Complaint: No chief complaint on file.    Disclaimer:  This note is prepared using voice recognition software and as such is likely to have errors and has not been proof read. Please contact me for questions.     HPI       Type 2 DM with hyperglycemia:  Current med: Metformin 1000 mg daily+ Jardiance 25 mg daily + Trulicity 1.5 mg sub Q weekly  Fasting B-140  evening BG: <160  dietary modifications: she is working towards sugar free as much as possible otherwise she tries to follow ADA    HTN and HLD associated with Type 2 DM:  BP at goal, compliant with medication  compliant with statin, no myalgias    reminded patient to schedule:  DEXA for this year 22   MMG 22      Lab Results   Component Value Date    HGBA1C 7.5 (H) 2022    HGBA1C 7.9 2022    HGBA1C 7.2 (H) 2021      Lab Results   Component Value Date    CHOL 140 2022    CHOL 169 2022    CHOL 159 2021     No results found for: LDLCALC    Wt Readings from Last 10 Encounters:   22 54.9 kg (121 lb 2.3 oz)       The ASCVD Risk score (Sprague LISA Jr., et al., 2013) failed to calculate for the following reasons:    The smoking status is invalid        Lab Results   Component Value Date    WBC 10.0 2022    HGB 13.9 2022    HCT 43.1 2022     2022    CHOL 140 2022    TRIG 88 2022    HDL 58 2022    ALT 21 2022    AST 17 2022     2022    K 3.8 2022    CREATININE 0.97 2022    BUN 12.1 2022    CO2 26 2022    TSH 0.2748 (L) 2022    HGBA1C 7.5 (H) 2022       X-Ray Shoulder 2 or More Views Left  XR Shoulder Left Minimum 2 Views     HISTORY: Fall     COMPARISON: None.     FINDINGS:       Anterior inferior dislocation of the left humeral head.  Otherwise no acute displaced fractures or dislocations.  Joint spaces preserved.  No blastic or lytic  lesions.  Soft tissues within normal limits.  Partially visualized left lung clear.       IMPRESSION:     Humeral head dislocation.  No fractures.        Electronically Signed By: Ge Pool MD  Date/Time Signed: 11/30/2019 09:42  X-Ray Shoulder Left 1 View  XR Shoulder Left 1 View     HISTORY: Post Reduction     COMPARISON: None.     FINDINGS: As below.     IMPRESSION:      Status post closed reduction for the previously described left humeral  head dislocation. Humeral head appears within the glenoid fossa.     No obvious acute displaced fractures.  Partially visualized lungs clear.        Electronically Signed By: Ge Pool MD  Date/Time Signed: 11/30/2019 09:42        Review of Systems  Objective:   There were no vitals filed for this visit.  Physical Exam  Assessment:     No diagnosis found.  Plan:   There are no diagnoses linked to this encounter.      Health Maintenance Due   Topic Date Due    Hepatitis C Screening  Never done    Foot Exam  Never done    TETANUS VACCINE  Never done    COVID-19 Vaccine (4 - Booster for Moderna series) 03/02/2022       No follow-ups on file.    There are no Patient Instructions on file for this visit.

## 2022-08-02 ENCOUNTER — TELEPHONE (OUTPATIENT)
Dept: FAMILY MEDICINE | Facility: CLINIC | Age: 77
End: 2022-08-02

## 2022-08-02 VITALS — SYSTOLIC BLOOD PRESSURE: 116 MMHG | DIASTOLIC BLOOD PRESSURE: 73 MMHG

## 2022-08-02 NOTE — TELEPHONE ENCOUNTER
Patient came in to the Clinic today for BP Check   Patient confirmed taking all BP meds as prescribed

## 2022-08-09 LAB
BCS RECOMMENDATION EXT: NORMAL
BMD RECOMMENDATION EXT: NORMAL
BMD RECOMMENDATION EXT: NORMAL

## 2022-08-11 ENCOUNTER — DOCUMENTATION ONLY (OUTPATIENT)
Dept: FAMILY MEDICINE | Facility: CLINIC | Age: 77
End: 2022-08-11
Payer: MEDICARE

## 2022-08-17 ENCOUNTER — TELEPHONE (OUTPATIENT)
Dept: FAMILY MEDICINE | Facility: CLINIC | Age: 77
End: 2022-08-17
Payer: MEDICARE

## 2022-08-17 NOTE — TELEPHONE ENCOUNTER
----- Message from Mundo Burr sent at 8/17/2022  3:14 PM CDT -----  .Type:  Needs Medical Advice    Who Called: Christina  Symptoms (please be specific):    How long has patient had these symptoms:    Pharmacy name and phone #:  [facility] on [address]  Would the patient rather a call back or a response via [facility]?   Best Call Back Number: [phone]. Please call her back  Additional Information: She has a sciatic problem she thinks she hurt her back while pulling on her . She told me that the pain is going down her leg. She was wondering if she could get a mild muscle relaxer.

## 2022-08-18 ENCOUNTER — DOCUMENTATION ONLY (OUTPATIENT)
Dept: ADMINISTRATIVE | Facility: HOSPITAL | Age: 77
End: 2022-08-18
Payer: MEDICARE

## 2022-08-22 ENCOUNTER — DOCUMENTATION ONLY (OUTPATIENT)
Dept: ADMINISTRATIVE | Facility: HOSPITAL | Age: 77
End: 2022-08-22
Payer: MEDICARE

## 2022-09-28 ENCOUNTER — HOSPITAL ENCOUNTER (OUTPATIENT)
Dept: RADIOLOGY | Facility: HOSPITAL | Age: 77
Discharge: HOME | End: 2022-09-28
Attending: NURSE PRACTITIONER
Payer: MEDICARE

## 2022-09-28 ENCOUNTER — OFFICE VISIT (OUTPATIENT)
Dept: FAMILY MEDICINE | Facility: CLINIC | Age: 77
End: 2022-09-28
Payer: MEDICARE

## 2022-09-28 VITALS
HEIGHT: 62 IN | DIASTOLIC BLOOD PRESSURE: 72 MMHG | HEART RATE: 80 BPM | TEMPERATURE: 99 F | WEIGHT: 96 LBS | OXYGEN SATURATION: 99 % | BODY MASS INDEX: 17.66 KG/M2 | RESPIRATION RATE: 18 BRPM | SYSTOLIC BLOOD PRESSURE: 114 MMHG

## 2022-09-28 DIAGNOSIS — M54.50 ACUTE RIGHT-SIDED LOW BACK PAIN WITHOUT SCIATICA: ICD-10-CM

## 2022-09-28 DIAGNOSIS — M54.50 ACUTE RIGHT-SIDED LOW BACK PAIN WITHOUT SCIATICA: Primary | ICD-10-CM

## 2022-09-28 PROCEDURE — 99213 PR OFFICE/OUTPT VISIT, EST, LEVL III, 20-29 MIN: ICD-10-PCS | Mod: ,,, | Performed by: NURSE PRACTITIONER

## 2022-09-28 PROCEDURE — 99213 OFFICE O/P EST LOW 20 MIN: CPT | Mod: ,,, | Performed by: NURSE PRACTITIONER

## 2022-09-28 PROCEDURE — 72100 X-RAY EXAM L-S SPINE 2/3 VWS: CPT | Mod: TC

## 2022-09-28 RX ORDER — TIZANIDINE 4 MG/1
4 TABLET ORAL NIGHTLY PRN
Qty: 30 TABLET | Refills: 0 | Status: SHIPPED | OUTPATIENT
Start: 2022-09-28 | End: 2022-10-24

## 2022-09-28 NOTE — PROGRESS NOTES
Subjective:      Patient ID: Christina Car is a 77 y.o. White female      Chief Complaint: Back Pain (^ back pain with activity >3 mos)       Past Medical History:   Diagnosis Date    Carpal tunnel syndrome     Chronic kidney disease, stage 3a 7/19/2022    HLD (hyperlipidemia)     HTN (hypertension)     Hyperlipidemia associated with type 2 diabetes mellitus 7/19/2022    Hypothyroidism, unspecified     Osteopenia     Type 2 diabetes mellitus with hyperglycemia 7/19/2022    Type 2 diabetes mellitus without complications         HPI  Back Pain  This is a recurrent problem. Episode onset: 3 months ago. The problem occurs intermittently. The problem has been gradually improving since onset. The pain is present in the lumbar spine. The pain radiates to the right thigh and right knee. The pain is moderate. Exacerbated by: activity. Pertinent negatives include no bladder incontinence, bowel incontinence, chest pain or fever. She has tried home exercises (Icy hot, heating pad) for the symptoms. The treatment provided moderate relief.   No pain on today.  Denies any other problems.       Review of Systems   Constitutional: Negative.  Negative for appetite change, chills and fever.   HENT: Negative.     Eyes: Negative.  Negative for discharge and redness.   Respiratory: Negative.  Negative for shortness of breath.    Cardiovascular: Negative.  Negative for chest pain.   Gastrointestinal: Negative.  Negative for bowel incontinence.   Endocrine: Negative.    Genitourinary: Negative.  Negative for bladder incontinence.   Musculoskeletal:  Positive for back pain.   Integumentary:  Negative.   Allergic/Immunologic: Negative.    Neurological: Negative.    Psychiatric/Behavioral: Negative.     All other systems reviewed and are negative.       Objective:      Vitals:    09/28/22 0954   BP: 114/72   Pulse: 80   Resp: 18   Temp: 98.7 °F (37.1 °C)      Body mass index is 17.56 kg/m².     Physical Exam  Vitals reviewed.    Constitutional:       Appearance: Normal appearance.   HENT:      Head: Normocephalic.      Mouth/Throat:      Mouth: Mucous membranes are moist.   Eyes:      Conjunctiva/sclera: Conjunctivae normal.      Pupils: Pupils are equal, round, and reactive to light.   Cardiovascular:      Rate and Rhythm: Normal rate and regular rhythm.   Pulmonary:      Effort: Pulmonary effort is normal. No respiratory distress.      Breath sounds: Normal breath sounds.   Musculoskeletal:         General: Normal range of motion.      Cervical back: Normal range of motion and neck supple.      Lumbar back: Normal.   Skin:     General: Skin is warm and dry.   Neurological:      Mental Status: She is alert and oriented to person, place, and time.   Psychiatric:         Mood and Affect: Mood normal.          Current Outpatient Medications:     amLODIPine (NORVASC) 10 MG tablet, Take 10 mg by mouth once daily., Disp: , Rfl:     atorvastatin (LIPITOR) 40 MG tablet, Take 40 mg by mouth once daily., Disp: , Rfl:     benazepriL (LOTENSIN) 40 MG tablet, Take 40 mg by mouth once daily., Disp: , Rfl:     calcium carbonate (OS-MEKA) 600 mg calcium (1,500 mg) Tab, Take 600 mg by mouth once daily., Disp: , Rfl:     doxazosin (CARDURA) 2 MG tablet, Take 2 mg by mouth once daily., Disp: , Rfl:     glipiZIDE (GLUCOTROL) 2.5 MG TR24, Take 2.5 mg by mouth as needed., Disp: , Rfl:     hydroCHLOROthiazide (HYDRODIURIL) 25 MG tablet, Take 25 mg by mouth once daily., Disp: , Rfl:     JARDIANCE 25 mg tablet, Take 25 mg by mouth every morning., Disp: , Rfl:     levothyroxine (SYNTHROID) 50 MCG tablet, Take 1 tablet (50 mcg total) by mouth before breakfast., Disp: 30 tablet, Rfl: 11    metFORMIN (GLUCOPHAGE) 1000 MG tablet, Take 1 tablet by mouth twice daily, Disp: 180 tablet, Rfl: 0    metoprolol succinate (TOPROL-XL) 25 MG 24 hr tablet, Take 1 tablet by mouth once daily, Disp: 90 tablet, Rfl: 4    TRULICITY 3 mg/0.5 mL pen injector, Inject 3 mg into the skin  once a week., Disp: , Rfl:     tiZANidine (ZANAFLEX) 4 MG tablet, Take 1 tablet (4 mg total) by mouth nightly as needed (back pain)., Disp: 30 tablet, Rfl: 0    Assessment & Plan:     Problem List Items Addressed This Visit          Orthopedic    Acute right-sided low back pain without sciatica - Primary     Improved  Declines PT; she does lumbar back exercises at home  Instructed to continue back exercises 3-4 times per week for 4-6 weeks  Will obtain baseline XR today  Instructed to follow up if no improvement for MRI consideration  Patient is agreeable to plan and verbalizes understanding         Relevant Medications    tiZANidine (ZANAFLEX) 4 MG tablet    Other Relevant Orders    X-Ray Lumbar Spine AP And Lateral

## 2022-09-28 NOTE — ASSESSMENT & PLAN NOTE
Improved  Declines PT; she does lumbar back exercises at home  Instructed to continue back exercises 3-4 times per week for 4-6 weeks  Will obtain baseline XR today  Instructed to follow up if no improvement for MRI consideration  Patient is agreeable to plan and verbalizes understanding

## 2022-10-04 DIAGNOSIS — E11.65 TYPE 2 DIABETES MELLITUS WITH HYPERGLYCEMIA, WITHOUT LONG-TERM CURRENT USE OF INSULIN: Primary | ICD-10-CM

## 2022-10-04 RX ORDER — DULAGLUTIDE 3 MG/.5ML
3 INJECTION, SOLUTION SUBCUTANEOUS WEEKLY
Qty: 4 PEN | Refills: 5 | Status: SHIPPED | OUTPATIENT
Start: 2022-10-04 | End: 2022-10-17 | Stop reason: SDUPTHER

## 2022-10-17 DIAGNOSIS — E11.65 TYPE 2 DIABETES MELLITUS WITH HYPERGLYCEMIA, WITHOUT LONG-TERM CURRENT USE OF INSULIN: ICD-10-CM

## 2022-10-17 RX ORDER — DULAGLUTIDE 3 MG/.5ML
3 INJECTION, SOLUTION SUBCUTANEOUS WEEKLY
Qty: 12 PEN | Refills: 1 | Status: SHIPPED | OUTPATIENT
Start: 2022-10-17 | End: 2023-01-15

## 2023-01-13 ENCOUNTER — TELEPHONE (OUTPATIENT)
Dept: FAMILY MEDICINE | Facility: CLINIC | Age: 78
End: 2023-01-13

## 2023-01-18 ENCOUNTER — TELEPHONE (OUTPATIENT)
Dept: FAMILY MEDICINE | Facility: CLINIC | Age: 78
End: 2023-01-18

## 2023-01-18 DIAGNOSIS — E11.65 TYPE 2 DIABETES MELLITUS WITH HYPERGLYCEMIA, WITHOUT LONG-TERM CURRENT USE OF INSULIN: Primary | ICD-10-CM

## 2023-01-18 PROBLEM — M85.80 OSTEOPENIA: Status: RESOLVED | Noted: 2022-07-19 | Resolved: 2023-01-18

## 2023-01-18 PROBLEM — M54.50 ACUTE RIGHT-SIDED LOW BACK PAIN WITHOUT SCIATICA: Status: RESOLVED | Noted: 2022-09-28 | Resolved: 2023-01-18

## 2023-01-18 RX ORDER — DULAGLUTIDE 1.5 MG/.5ML
1.5 INJECTION, SOLUTION SUBCUTANEOUS
Qty: 4 PEN | Refills: 11 | Status: SHIPPED | OUTPATIENT
Start: 2023-01-18 | End: 2023-12-04

## 2023-01-18 RX ORDER — GLIPIZIDE 5 MG/1
5 TABLET, FILM COATED, EXTENDED RELEASE ORAL
Qty: 90 TABLET | Refills: 3 | Status: SHIPPED | OUTPATIENT
Start: 2023-01-18 | End: 2023-02-17

## 2023-01-18 NOTE — PROGRESS NOTES
Patient says pharmacy out of stock of trulicity 3 mg subQ dose  New Rx for trulciity 1.5 mg subQ sent  Adjust glipizide from 2.5 mg daily to 5 mg daily  Continue jardiance

## 2023-01-18 NOTE — TELEPHONE ENCOUNTER
Spoke with pharmacy they stated that she has Trulicity 3 mg on file  Spoke with Dr Mendiola   She stated she will call in Trulicity 1.5mg and Glipizide 5mg qday  Spoke with pt  Recommendations given  Veralized understanding

## 2023-01-18 NOTE — TELEPHONE ENCOUNTER
Please call patient- for her diabetes I have her listed as taking jardiance 25 mg po daily , trulicity 1.5 mg subQ weekly and also glipizide 2.5 mg po daily as needed for elevated sugar.  If the pharmacy is out of trulicity, what dose did they have in stock. I can send the dose they have in stock for her. She needs to also increase her glipizide from one tablet by mouth daily to two tablets by mouth daily to help control her sugar until we get her back on trulicity.  Patient also missed her appt today- please make sure she reschedules- ok to add on this week or next week.   Please let me know what dose pharmacy did have in stock and make sure patient is aware to increase her glipizide in the interim as discussed above.  thanks

## 2023-01-23 LAB
LEFT EYE DM RETINOPATHY: NEGATIVE
RIGHT EYE DM RETINOPATHY: NEGATIVE

## 2023-02-17 ENCOUNTER — OFFICE VISIT (OUTPATIENT)
Dept: FAMILY MEDICINE | Facility: CLINIC | Age: 78
End: 2023-02-17
Payer: MEDICARE

## 2023-02-17 VITALS
SYSTOLIC BLOOD PRESSURE: 128 MMHG | OXYGEN SATURATION: 99 % | HEART RATE: 66 BPM | TEMPERATURE: 98 F | WEIGHT: 104.63 LBS | DIASTOLIC BLOOD PRESSURE: 70 MMHG | HEIGHT: 62 IN | BODY MASS INDEX: 19.25 KG/M2 | RESPIRATION RATE: 18 BRPM

## 2023-02-17 DIAGNOSIS — E11.59 HYPERTENSION ASSOCIATED WITH TYPE 2 DIABETES MELLITUS: ICD-10-CM

## 2023-02-17 DIAGNOSIS — R92.8 ABNORMAL MAMMOGRAM: ICD-10-CM

## 2023-02-17 DIAGNOSIS — E11.69 HYPERLIPIDEMIA ASSOCIATED WITH TYPE 2 DIABETES MELLITUS: ICD-10-CM

## 2023-02-17 DIAGNOSIS — Z12.31 ENCOUNTER FOR SCREENING MAMMOGRAM FOR BREAST CANCER: ICD-10-CM

## 2023-02-17 DIAGNOSIS — I15.2 HYPERTENSION ASSOCIATED WITH TYPE 2 DIABETES MELLITUS: ICD-10-CM

## 2023-02-17 DIAGNOSIS — E11.65 TYPE 2 DIABETES MELLITUS WITH HYPERGLYCEMIA, WITHOUT LONG-TERM CURRENT USE OF INSULIN: Primary | ICD-10-CM

## 2023-02-17 DIAGNOSIS — E03.9 HYPOTHYROIDISM, UNSPECIFIED TYPE: ICD-10-CM

## 2023-02-17 DIAGNOSIS — N18.31 CHRONIC KIDNEY DISEASE, STAGE 3A: ICD-10-CM

## 2023-02-17 DIAGNOSIS — E78.5 HYPERLIPIDEMIA ASSOCIATED WITH TYPE 2 DIABETES MELLITUS: ICD-10-CM

## 2023-02-17 DIAGNOSIS — Z11.59 NEED FOR HEPATITIS C SCREENING TEST: ICD-10-CM

## 2023-02-17 PROCEDURE — 99214 PR OFFICE/OUTPT VISIT, EST, LEVL IV, 30-39 MIN: ICD-10-PCS | Mod: ,,, | Performed by: INTERNAL MEDICINE

## 2023-02-17 PROCEDURE — 99214 OFFICE O/P EST MOD 30 MIN: CPT | Mod: ,,, | Performed by: INTERNAL MEDICINE

## 2023-02-17 RX ORDER — OXYBUTYNIN CHLORIDE 5 MG/1
5 TABLET ORAL NIGHTLY
Qty: 30 TABLET | Refills: 11 | Status: SHIPPED | OUTPATIENT
Start: 2023-02-17 | End: 2024-02-01

## 2023-02-17 RX ORDER — GLIPIZIDE 5 MG/1
5 TABLET ORAL
Qty: 60 TABLET | Refills: 11 | Status: SHIPPED | OUTPATIENT
Start: 2023-02-17 | End: 2024-02-08

## 2023-02-17 NOTE — PROGRESS NOTES
Subjective:      Patient ID: Christina Car is a 77 y.o. female.    Chief Complaint: Follow-up, Hypertension, Hyperlipidemia, and Diabetes    HPI  Last wellness: 07/19/2022  Patient is here for her 6 month f/u visit  No acute changes since last visit  She is not compliant with diet lately- says she eats whatever she wants  Not checking sugar regularly- but says when she has checked her fasting BG is 160s  Compliant with her medication     Chronic Medical Conditions:     Type 2 DM with hyperglycemia:  Current med: Metformin 1000 mg daily+ Jardiance 25 mg daily + Trulicity 1.5 mg sub Q weekly + Glipizide 5 mg xr daily  She is not checking her FBG daily   Last A1c 7.5 (from 7.9)  Diet is high in rice/sweets, she tries but has trouble staying consistent  Eye exam: completed Feb 2023 Dr. Wyman- med record release signed today  Foot exam: 2/17/2023 no DN     HTN and HLD associated with Type 2 DM:  BP uncontrolled- her home BP log shows very low readings  compliant with statin, no myalgias    Hypothyroidism:  Compliant with medication       CKD stage 3a: GFR 59  She avoids NSAID  Drink plenty of water     Osteopenia: compliant with vitamin D/Calcium        DEXA 8/22: Osteopenia, compliant with vitamin D/calcium  MMG 8/9/22 [facility]: negative for malignancy   Orders for 8/2023 placed [facility]     Health Maintenance Due   Topic Date Due    Hepatitis C Screening  Never done    COVID-19 Vaccine (5 - Booster for Moderna series) 05/29/2022    Hemoglobin A1c  01/11/2023    Eye Exam  01/12/2023    Diabetes Urine Screening  02/19/2023     Review of Systems   Constitutional:  Negative for chills and fever.   HENT:  Negative for congestion, sinus pressure and sore throat.    Respiratory:  Negative for cough and shortness of breath.    Cardiovascular:  Negative for chest pain and palpitations.   Gastrointestinal:  Negative for abdominal pain and nausea.   Skin:  Negative for rash.   A comprehensive ROS was performed and is negative except  "as noted above.  Objective:     Vitals:    02/17/23 0910 02/17/23 0911   BP: (!) 154/78 128/70   BP Location: Right arm Left arm   Patient Position: Sitting Sitting   BP Method: Small (Automatic) Small (Automatic)   Pulse: 66    Resp: 18    Temp: 98.2 °F (36.8 °C)    TempSrc: Oral    SpO2: 99%    Weight: 47.4 kg (104 lb 9.6 oz)    Height: 5' 2" (1.575 m)      Physical Exam  Vitals and nursing note reviewed.   Constitutional:       General: She is not in acute distress.     Appearance: She is well-developed. She is not diaphoretic.   HENT:      Head: Normocephalic and atraumatic.   Eyes:      General:         Right eye: No discharge.         Left eye: No discharge.      Conjunctiva/sclera: Conjunctivae normal.      Pupils: Pupils are equal, round, and reactive to light.   Neck:      Thyroid: No thyromegaly.   Cardiovascular:      Rate and Rhythm: Normal rate and regular rhythm.      Heart sounds: Normal heart sounds. No murmur heard.  Pulmonary:      Effort: Pulmonary effort is normal. No respiratory distress.      Breath sounds: Normal breath sounds. No wheezing or rales.   Abdominal:      General: There is no distension.      Palpations: Abdomen is soft.      Tenderness: There is no abdominal tenderness.   Musculoskeletal:      Cervical back: Normal range of motion and neck supple.   Lymphadenopathy:      Cervical: No cervical adenopathy.   Skin:     General: Skin is warm and dry.   Neurological:      Mental Status: She is alert and oriented to person, place, and time.   Psychiatric:         Mood and Affect: Mood normal.         Behavior: Behavior normal.   Protective Sensation (w/ 10 gram monofilament):  Right: Intact  Left: Intact    Visual Inspection:  Onychomycosis -  Bilateral    Pedal Pulses:   Right: Present  Left: Present    Posterior tibialis:   Right:Present  Left: Present    Assessment/Plan:     1. Type 2 diabetes mellitus with hyperglycemia, without long-term current use of insulin  -     Comprehensive " Metabolic Panel; Future; Expected date: 02/17/2023  -     Lipid Panel; Future; Expected date: 02/17/2023  -     CBC Auto Differential; Future; Expected date: 02/17/2023  -     Hemoglobin A1C; Future; Expected date: 02/17/2023  -     Urinalysis; Future; Expected date: 02/17/2023  -     TSH; Future; Expected date: 02/17/2023  -     Microalbumin/Creatinine Ratio, Urine; Future; Expected date: 02/17/2023  Unstable  She reports a few FBG up to 160  Increase glipizide to 5 mg po BID  Continue trulicity, jardiance, metformin  Request copy of DM eye exam  Foot exam done   2. Hypothyroidism, unspecified type  -     Comprehensive Metabolic Panel; Future; Expected date: 02/17/2023  -     Lipid Panel; Future; Expected date: 02/17/2023  -     CBC Auto Differential; Future; Expected date: 02/17/2023  -     Hemoglobin A1C; Future; Expected date: 02/17/2023  -     Urinalysis; Future; Expected date: 02/17/2023  -     TSH; Future; Expected date: 02/17/2023  -     Microalbumin/Creatinine Ratio, Urine; Future; Expected date: 02/17/2023  -     T4, Free; Future; Expected date: 02/17/2023  Stable , continue current Rx medication  Check TSH now  3. Hypertension associated with type 2 diabetes mellitus  -     Comprehensive Metabolic Panel; Future; Expected date: 02/17/2023  -     Lipid Panel; Future; Expected date: 02/17/2023  -     CBC Auto Differential; Future; Expected date: 02/17/2023  -     Hemoglobin A1C; Future; Expected date: 02/17/2023  -     Urinalysis; Future; Expected date: 02/17/2023  -     TSH; Future; Expected date: 02/17/2023  -     Microalbumin/Creatinine Ratio, Urine; Future; Expected date: 02/17/2023  Stable, continue current HCTZ, benazepril  Home BP ad hoc we have calibrated her BP machine   She is not using doxazosin  4. Hyperlipidemia associated with type 2 diabetes mellitus  -     Comprehensive Metabolic Panel; Future; Expected date: 02/17/2023  -     Lipid Panel; Future; Expected date: 02/17/2023  -     CBC Auto  Differential; Future; Expected date: 02/17/2023  -     Hemoglobin A1C; Future; Expected date: 02/17/2023  -     Urinalysis; Future; Expected date: 02/17/2023  -     TSH; Future; Expected date: 02/17/2023  -     Microalbumin/Creatinine Ratio, Urine; Future; Expected date: 02/17/2023  Stable, continue statin, low fat ADA diet advised   5. Chronic kidney disease, stage 3a  -     Comprehensive Metabolic Panel; Future; Expected date: 02/17/2023  -     Lipid Panel; Future; Expected date: 02/17/2023  -     CBC Auto Differential; Future; Expected date: 02/17/2023  -     Hemoglobin A1C; Future; Expected date: 02/17/2023  -     Urinalysis; Future; Expected date: 02/17/2023  -     TSH; Future; Expected date: 02/17/2023  -     Microalbumin/Creatinine Ratio, Urine; Future; Expected date: 02/17/2023  Stable, continue to avoid NSAID and cox2 inhibitors, drink 64 oz water daily  6. Need for hepatitis C screening test  -     Hepatitis C antibody; Future; Expected date: 02/17/2023    7. Encounter for screening mammogram for breast cancer  -     Mammo Digital Screening Bilat w/ Roger; Future; Expected date: 08/17/2023  -     US Breast Bilateral Complete; Future; Expected date: 08/17/2023    8. Abnormal mammogram  -     Mammo Digital Screening Bilat w/ Roger; Future; Expected date: 08/17/2023  -     US Breast Bilateral Complete; Future; Expected date: 08/17/2023  Mammogram and US given dense breast tissue  Other orders  -     glipiZIDE (GLUCOTROL) 5 MG tablet; Take 1 tablet (5 mg total) by mouth 2 (two) times daily before meals.  Dispense: 60 tablet; Refill: 11  -     oxybutynin (DITROPAN) 5 MG Tab; Take 1 tablet (5 mg total) by mouth every evening.  Dispense: 30 tablet; Refill: 11    Labs now, will call with results  Labs again in 6 months      Follow up in about 6 months (around 8/17/2023) for Medicare Wellness with labs .    This includes face to face time and non-face to face time preparing to see the patient (eg, review of tests),  obtaining and/or reviewing separately obtained history, documenting clinical information in the electronic or other health record, independently interpreting results and communicating results to the patient/family/caregiver, or care coordinator.

## 2023-02-27 ENCOUNTER — LAB VISIT (OUTPATIENT)
Dept: LAB | Facility: HOSPITAL | Age: 78
End: 2023-02-27
Attending: INTERNAL MEDICINE
Payer: MEDICARE

## 2023-02-27 DIAGNOSIS — E11.65 TYPE 2 DIABETES MELLITUS WITH HYPERGLYCEMIA, WITHOUT LONG-TERM CURRENT USE OF INSULIN: ICD-10-CM

## 2023-02-27 DIAGNOSIS — E11.69 HYPERLIPIDEMIA ASSOCIATED WITH TYPE 2 DIABETES MELLITUS: ICD-10-CM

## 2023-02-27 DIAGNOSIS — N18.31 CHRONIC KIDNEY DISEASE, STAGE 3A: ICD-10-CM

## 2023-02-27 DIAGNOSIS — E78.5 HYPERLIPIDEMIA ASSOCIATED WITH TYPE 2 DIABETES MELLITUS: ICD-10-CM

## 2023-02-27 DIAGNOSIS — E03.9 HYPOTHYROIDISM, UNSPECIFIED TYPE: ICD-10-CM

## 2023-02-27 DIAGNOSIS — I15.2 HYPERTENSION ASSOCIATED WITH TYPE 2 DIABETES MELLITUS: ICD-10-CM

## 2023-02-27 DIAGNOSIS — Z11.59 NEED FOR HEPATITIS C SCREENING TEST: ICD-10-CM

## 2023-02-27 DIAGNOSIS — E11.59 HYPERTENSION ASSOCIATED WITH TYPE 2 DIABETES MELLITUS: ICD-10-CM

## 2023-02-27 LAB
ALBUMIN SERPL-MCNC: 4.4 G/DL (ref 3.4–4.8)
ALBUMIN/GLOB SERPL: 1.3 RATIO (ref 1.1–2)
ALP SERPL-CCNC: 78 UNIT/L (ref 40–150)
ALT SERPL-CCNC: 29 UNIT/L (ref 0–55)
AST SERPL-CCNC: 19 UNIT/L (ref 5–34)
BASOPHILS # BLD AUTO: 0.09 X10(3)/MCL (ref 0–0.2)
BASOPHILS NFR BLD AUTO: 1.3 %
BILIRUBIN DIRECT+TOT PNL SERPL-MCNC: 1 MG/DL
BUN SERPL-MCNC: 18 MG/DL (ref 9.8–20.1)
CALCIUM SERPL-MCNC: 10.5 MG/DL (ref 8.4–10.2)
CHLORIDE SERPL-SCNC: 101 MMOL/L (ref 98–107)
CHOLEST SERPL-MCNC: 192 MG/DL
CHOLEST/HDLC SERPL: 3 {RATIO} (ref 0–5)
CO2 SERPL-SCNC: 26 MMOL/L (ref 23–31)
CREAT SERPL-MCNC: 1.25 MG/DL (ref 0.55–1.02)
EOSINOPHIL # BLD AUTO: 0.22 X10(3)/MCL (ref 0–0.9)
EOSINOPHIL NFR BLD AUTO: 3.1 %
ERYTHROCYTE [DISTWIDTH] IN BLOOD BY AUTOMATED COUNT: 12.7 % (ref 11.5–17)
EST. AVERAGE GLUCOSE BLD GHB EST-MCNC: 197.3 MG/DL
GFR SERPLBLD CREATININE-BSD FMLA CKD-EPI: 44 MLS/MIN/1.73/M2
GLOBULIN SER-MCNC: 3.5 GM/DL (ref 2.4–3.5)
GLUCOSE SERPL-MCNC: 161 MG/DL (ref 82–115)
HBA1C MFR BLD: 8.5 %
HCT VFR BLD AUTO: 46.2 % (ref 37–47)
HCV AB SERPL QL IA: NONREACTIVE
HDLC SERPL-MCNC: 74 MG/DL (ref 35–60)
HGB BLD-MCNC: 14.8 G/DL (ref 12–16)
IMM GRANULOCYTES # BLD AUTO: 0.01 X10(3)/MCL (ref 0–0.04)
IMM GRANULOCYTES NFR BLD AUTO: 0.1 %
LDLC SERPL CALC-MCNC: 96 MG/DL (ref 50–140)
LYMPHOCYTES # BLD AUTO: 2.44 X10(3)/MCL (ref 0.6–4.6)
LYMPHOCYTES NFR BLD AUTO: 34.8 %
MCH RBC QN AUTO: 28.9 PG
MCHC RBC AUTO-ENTMCNC: 32 G/DL (ref 33–36)
MCV RBC AUTO: 90.2 FL (ref 80–94)
MONOCYTES # BLD AUTO: 0.39 X10(3)/MCL (ref 0.1–1.3)
MONOCYTES NFR BLD AUTO: 5.6 %
NEUTROPHILS # BLD AUTO: 3.86 X10(3)/MCL (ref 2.1–9.2)
NEUTROPHILS NFR BLD AUTO: 55.1 %
NRBC BLD AUTO-RTO: 0 %
PLATELET # BLD AUTO: 199 X10(3)/MCL (ref 130–400)
PMV BLD AUTO: 10.2 FL (ref 7.4–10.4)
POTASSIUM SERPL-SCNC: 4.1 MMOL/L (ref 3.5–5.1)
PROT SERPL-MCNC: 7.9 GM/DL (ref 5.8–7.6)
RBC # BLD AUTO: 5.12 X10(6)/MCL (ref 4.2–5.4)
SODIUM SERPL-SCNC: 143 MMOL/L (ref 136–145)
T4 FREE SERPL-MCNC: 1.34 NG/DL (ref 0.7–1.48)
TRIGL SERPL-MCNC: 112 MG/DL (ref 37–140)
TSH SERPL-ACNC: 3.73 UIU/ML (ref 0.35–4.94)
VLDLC SERPL CALC-MCNC: 22 MG/DL
WBC # SPEC AUTO: 7 X10(3)/MCL (ref 4.5–11.5)

## 2023-02-27 PROCEDURE — 86803 HEPATITIS C AB TEST: CPT

## 2023-02-27 PROCEDURE — 85025 COMPLETE CBC W/AUTO DIFF WBC: CPT

## 2023-02-27 PROCEDURE — 80053 COMPREHEN METABOLIC PANEL: CPT

## 2023-02-27 PROCEDURE — 84439 ASSAY OF FREE THYROXINE: CPT

## 2023-02-27 PROCEDURE — 83036 HEMOGLOBIN GLYCOSYLATED A1C: CPT

## 2023-02-27 PROCEDURE — 84443 ASSAY THYROID STIM HORMONE: CPT

## 2023-02-27 PROCEDURE — 80061 LIPID PANEL: CPT

## 2023-02-27 PROCEDURE — 36415 COLL VENOUS BLD VENIPUNCTURE: CPT

## 2023-02-28 DIAGNOSIS — E83.52 HYPERCALCEMIA: Primary | ICD-10-CM

## 2023-02-28 DIAGNOSIS — N18.31 CHRONIC KIDNEY DISEASE, STAGE 3A: ICD-10-CM

## 2023-02-28 RX ORDER — NITROFURANTOIN 25; 75 MG/1; MG/1
100 CAPSULE ORAL 2 TIMES DAILY
Qty: 10 CAPSULE | Refills: 0 | Status: SHIPPED | OUTPATIENT
Start: 2023-02-28 | End: 2023-03-01

## 2023-03-01 RX ORDER — CIPROFLOXACIN 500 MG/1
500 TABLET ORAL EVERY 12 HOURS
Qty: 10 TABLET | Refills: 0 | Status: SHIPPED | OUTPATIENT
Start: 2023-03-01 | End: 2023-03-06

## 2023-03-07 ENCOUNTER — TELEPHONE (OUTPATIENT)
Dept: FAMILY MEDICINE | Facility: CLINIC | Age: 78
End: 2023-03-07
Payer: MEDICARE

## 2023-03-07 DIAGNOSIS — G89.29 CHRONIC RIGHT-SIDED LOW BACK PAIN WITH RIGHT-SIDED SCIATICA: Primary | ICD-10-CM

## 2023-03-07 DIAGNOSIS — M54.41 CHRONIC RIGHT-SIDED LOW BACK PAIN WITH RIGHT-SIDED SCIATICA: Primary | ICD-10-CM

## 2023-03-07 NOTE — TELEPHONE ENCOUNTER
Pt is requesting medications for her back pain  She stated that she spoke with you for her visit about this  She wanted to try exercises first  She did and they have not helped much  Please advise  Thanks

## 2023-03-08 NOTE — TELEPHONE ENCOUNTER
----- Message from Subha Lambert sent at 3/8/2023 10:52 AM CST -----  Regarding: med adv  Type:  Needs Medical Advice    Who Called: patient  Symptoms (please be specific): back pain   How long has patient had these symptoms:  months  Pharmacy name and phone #:  [facility] on [address]  Would the patient rather a call back or a response via [facility]?   Best Call Back Number: [phone]  Additional Information: patient called inquiring for the rx that Dr. Mendiola was to prescribe. Please call patient back.

## 2023-03-08 NOTE — TELEPHONE ENCOUNTER
Patient stated she was diagnosed with scoliosis. She saw Dr. Mendiola last week. She didn't have pain. Know she has back pain radiating into right hip and knee. She is doing PT exercises from [employer]. She can't do exercises 4-5x/day. She is requesting a rx for pain. Please advise. Hermelindo

## 2023-03-09 RX ORDER — METHOCARBAMOL 500 MG/1
500 TABLET, FILM COATED ORAL 2 TIMES DAILY PRN
Qty: 40 TABLET | Refills: 1 | Status: SHIPPED | OUTPATIENT
Start: 2023-03-09 | End: 2023-03-19

## 2023-03-09 NOTE — TELEPHONE ENCOUNTER
I spoke to patient over phone she has chronic lower back pain radiating down the R buttocks and down the RLE, she has done xray a few months ago c/w arthritis and DDD and bone spurs, recommend avoid bed rest, PT referral placed, Robaxin 500 mg PO BID PRN LBP, Tylenol arthritis by mouth BID. If no improvement within next 6 weeks or worsen, please come to office for evaluation and possible MRI. She expressed understanding to our care plan.

## 2023-03-21 ENCOUNTER — DOCUMENTATION ONLY (OUTPATIENT)
Dept: FAMILY MEDICINE | Facility: CLINIC | Age: 78
End: 2023-03-21
Payer: MEDICARE

## 2023-05-04 DIAGNOSIS — E11.59 HYPERTENSION ASSOCIATED WITH TYPE 2 DIABETES MELLITUS: ICD-10-CM

## 2023-05-04 DIAGNOSIS — I15.2 HYPERTENSION ASSOCIATED WITH TYPE 2 DIABETES MELLITUS: ICD-10-CM

## 2023-05-05 RX ORDER — AMLODIPINE BESYLATE 10 MG/1
TABLET ORAL
Qty: 90 TABLET | Refills: 0 | Status: SHIPPED | OUTPATIENT
Start: 2023-05-05 | End: 2023-07-31

## 2023-06-01 DIAGNOSIS — E11.65 TYPE 2 DIABETES MELLITUS WITH HYPERGLYCEMIA, WITHOUT LONG-TERM CURRENT USE OF INSULIN: ICD-10-CM

## 2023-06-01 DIAGNOSIS — I15.2 HYPERTENSION ASSOCIATED WITH TYPE 2 DIABETES MELLITUS: ICD-10-CM

## 2023-06-01 DIAGNOSIS — E11.59 HYPERTENSION ASSOCIATED WITH TYPE 2 DIABETES MELLITUS: ICD-10-CM

## 2023-06-02 RX ORDER — EMPAGLIFLOZIN 25 MG/1
TABLET, FILM COATED ORAL
Qty: 90 TABLET | Refills: 0 | Status: SHIPPED | OUTPATIENT
Start: 2023-06-02 | End: 2023-08-23

## 2023-06-05 ENCOUNTER — CLINICAL SUPPORT (OUTPATIENT)
Dept: LAB | Facility: HOSPITAL | Age: 78
End: 2023-06-05
Attending: NURSE PRACTITIONER
Payer: MEDICARE

## 2023-06-05 ENCOUNTER — OFFICE VISIT (OUTPATIENT)
Dept: FAMILY MEDICINE | Facility: CLINIC | Age: 78
End: 2023-06-05
Payer: MEDICARE

## 2023-06-05 VITALS
BODY MASS INDEX: 18.77 KG/M2 | WEIGHT: 102 LBS | RESPIRATION RATE: 19 BRPM | OXYGEN SATURATION: 97 % | HEIGHT: 62 IN | SYSTOLIC BLOOD PRESSURE: 123 MMHG | TEMPERATURE: 98 F | DIASTOLIC BLOOD PRESSURE: 63 MMHG | HEART RATE: 64 BPM

## 2023-06-05 DIAGNOSIS — E83.52 HYPERCALCEMIA: Primary | ICD-10-CM

## 2023-06-05 DIAGNOSIS — Z01.818 PRE-OP EXAM: Primary | ICD-10-CM

## 2023-06-05 DIAGNOSIS — Z01.818 PRE-OP EXAM: ICD-10-CM

## 2023-06-05 LAB
ALBUMIN SERPL-MCNC: 4.1 G/DL (ref 3.4–4.8)
ALBUMIN/GLOB SERPL: 1.4 RATIO (ref 1.1–2)
ALP SERPL-CCNC: 72 UNIT/L (ref 40–150)
ALT SERPL-CCNC: 28 UNIT/L (ref 0–55)
APPEARANCE UR: CLEAR
AST SERPL-CCNC: 26 UNIT/L (ref 5–34)
BASOPHILS # BLD AUTO: 0.06 X10(3)/MCL
BASOPHILS NFR BLD AUTO: 0.8 %
BILIRUB UR QL STRIP.AUTO: NEGATIVE MG/DL
BILIRUBIN DIRECT+TOT PNL SERPL-MCNC: 0.9 MG/DL
BUN SERPL-MCNC: 11.4 MG/DL (ref 9.8–20.1)
CALCIUM SERPL-MCNC: 10.7 MG/DL (ref 8.4–10.2)
CHLORIDE SERPL-SCNC: 96 MMOL/L (ref 98–107)
CO2 SERPL-SCNC: 29 MMOL/L (ref 23–31)
COLOR UR: ABNORMAL
CREAT SERPL-MCNC: 0.9 MG/DL (ref 0.55–1.02)
DEPRECATED CALCIDIOL+CALCIFEROL SERPL-MC: 38 NG/ML (ref 30–80)
EOSINOPHIL # BLD AUTO: 0.08 X10(3)/MCL (ref 0–0.9)
EOSINOPHIL NFR BLD AUTO: 1.1 %
ERYTHROCYTE [DISTWIDTH] IN BLOOD BY AUTOMATED COUNT: 12.5 % (ref 11.5–17)
GFR SERPLBLD CREATININE-BSD FMLA CKD-EPI: >60 MLS/MIN/1.73/M2
GLOBULIN SER-MCNC: 2.9 GM/DL (ref 2.4–3.5)
GLUCOSE SERPL-MCNC: 113 MG/DL (ref 82–115)
GLUCOSE UR QL STRIP.AUTO: >=1000 MG/DL
HCT VFR BLD AUTO: 42.6 % (ref 37–47)
HGB BLD-MCNC: 14.2 G/DL (ref 12–16)
IMM GRANULOCYTES # BLD AUTO: 0.02 X10(3)/MCL (ref 0–0.04)
IMM GRANULOCYTES NFR BLD AUTO: 0.3 %
KETONES UR QL STRIP.AUTO: NEGATIVE MG/DL
LEUKOCYTE ESTERASE UR QL STRIP.AUTO: NEGATIVE UNIT/L
LYMPHOCYTES # BLD AUTO: 2.29 X10(3)/MCL (ref 0.6–4.6)
LYMPHOCYTES NFR BLD AUTO: 31.5 %
MCH RBC QN AUTO: 29.2 PG (ref 27–31)
MCHC RBC AUTO-ENTMCNC: 33.3 G/DL (ref 33–36)
MCV RBC AUTO: 87.7 FL (ref 80–94)
MONOCYTES # BLD AUTO: 0.4 X10(3)/MCL (ref 0.1–1.3)
MONOCYTES NFR BLD AUTO: 5.5 %
NEUTROPHILS # BLD AUTO: 4.41 X10(3)/MCL (ref 2.1–9.2)
NEUTROPHILS NFR BLD AUTO: 60.8 %
NITRITE UR QL STRIP.AUTO: NEGATIVE
NRBC BLD AUTO-RTO: 0 %
PH UR STRIP.AUTO: 6 [PH]
PLATELET # BLD AUTO: 317 X10(3)/MCL (ref 130–400)
PMV BLD AUTO: 8 FL (ref 7.4–10.4)
POTASSIUM SERPL-SCNC: 3.5 MMOL/L (ref 3.5–5.1)
PROT SERPL-MCNC: 7 GM/DL (ref 5.8–7.6)
PROT UR QL STRIP.AUTO: NEGATIVE MG/DL
PTH-INTACT SERPL-MCNC: 54.7 PG/ML (ref 8.7–77)
RBC # BLD AUTO: 4.86 X10(6)/MCL (ref 4.2–5.4)
RBC UR QL AUTO: NEGATIVE UNIT/L
SODIUM SERPL-SCNC: 137 MMOL/L (ref 136–145)
SP GR UR STRIP.AUTO: <=1.005
UROBILINOGEN UR STRIP-ACNC: 0.2 MG/DL
WBC # SPEC AUTO: 7.26 X10(3)/MCL (ref 4.5–11.5)

## 2023-06-05 PROCEDURE — 36415 COLL VENOUS BLD VENIPUNCTURE: CPT

## 2023-06-05 PROCEDURE — 99213 PR OFFICE/OUTPT VISIT, EST, LEVL III, 20-29 MIN: ICD-10-PCS | Mod: ,,, | Performed by: NURSE PRACTITIONER

## 2023-06-05 PROCEDURE — 80053 COMPREHEN METABOLIC PANEL: CPT

## 2023-06-05 PROCEDURE — 93010 EKG 12-LEAD: ICD-10-PCS | Mod: ,,, | Performed by: INTERNAL MEDICINE

## 2023-06-05 PROCEDURE — 93010 ELECTROCARDIOGRAM REPORT: CPT | Mod: ,,, | Performed by: INTERNAL MEDICINE

## 2023-06-05 PROCEDURE — 83970 ASSAY OF PARATHORMONE: CPT

## 2023-06-05 PROCEDURE — 85025 COMPLETE CBC W/AUTO DIFF WBC: CPT

## 2023-06-05 PROCEDURE — 82306 VITAMIN D 25 HYDROXY: CPT

## 2023-06-05 PROCEDURE — 93005 ELECTROCARDIOGRAM TRACING: CPT

## 2023-06-05 PROCEDURE — 99213 OFFICE O/P EST LOW 20 MIN: CPT | Mod: ,,, | Performed by: NURSE PRACTITIONER

## 2023-06-05 RX ORDER — IBUPROFEN 800 MG/1
800 TABLET ORAL EVERY 6 HOURS PRN
COMMUNITY
Start: 2023-05-24 | End: 2023-09-19 | Stop reason: ALTCHOICE

## 2023-06-05 RX ORDER — PENICILLIN V POTASSIUM 500 MG/1
500 TABLET, FILM COATED ORAL 4 TIMES DAILY
COMMUNITY
Start: 2023-05-24 | End: 2023-08-08 | Stop reason: ALTCHOICE

## 2023-06-05 NOTE — PROGRESS NOTES
Subjective:      Patient ID: Christina Car is a 78 y.o. White female      Chief Complaint: Sx Clearance (Dental)       Past Medical History:   Diagnosis Date    Carpal tunnel syndrome     Chronic kidney disease, stage 3a 7/19/2022    HLD (hyperlipidemia)     HTN (hypertension)     Hyperlipidemia associated with type 2 diabetes mellitus 7/19/2022    Hypothyroidism, unspecified     Osteopenia     Type 2 diabetes mellitus with hyperglycemia 7/19/2022    Type 2 diabetes mellitus without complications         HPI  Presents to clinic for surgery clearance.    Surgery: Denture extractions  Surgeon: Ankit Correia  Date: 6/13/2023  Anesthesia type: IV sedation  Allergies to medications: NKA  Previous exposure to anesthesia: yes  Complications secondary to anesthesia:  no       Preoperative Evaluation:  Step 1: Emergency Surgery? No  Step 2: Has coronary revascularization been done in the past 5 years? No  Step 3: Has coronary angiography or stress test been done in past 2 years? No  Step 4: Evaluate clinical predictors:  No symptoms of stable or unstable angina, no history of arrhythmias, no history of severe valvular heart disease, no history of uncontrolled hypertension, no history of abnormal EKG, no history of prior myocardial infarction, no history to suggest congestive heart failure, no history of prior CHF, no history of diabetes or renal insufficiency     EKG performed today: yes  Preoperative Labs ordered: yes  Medications adjusted: hold fluid pills and DM meds the morning of procedure             Review of Systems   Constitutional: Negative.  Negative for appetite change, chills and fever.   HENT: Negative.     Eyes: Negative.  Negative for discharge and redness.   Respiratory: Negative.  Negative for shortness of breath.    Cardiovascular: Negative.  Negative for chest pain.   Gastrointestinal: Negative.    Endocrine: Negative.    Genitourinary: Negative.    Integumentary:  Negative.   Allergic/Immunologic:  Negative.    Neurological: Negative.    Psychiatric/Behavioral: Negative.     All other systems reviewed and are negative.       Objective:      Vitals:    06/05/23 0938   BP: 123/63   Pulse: 64   Resp: 19   Temp: 98.2 °F (36.8 °C)      Body mass index is 18.66 kg/m².     Physical Exam  Vitals reviewed.   Constitutional:       Appearance: Normal appearance.   HENT:      Head: Normocephalic.      Mouth/Throat:      Mouth: Mucous membranes are moist.   Eyes:      Conjunctiva/sclera: Conjunctivae normal.      Pupils: Pupils are equal, round, and reactive to light.   Cardiovascular:      Rate and Rhythm: Normal rate and regular rhythm.   Pulmonary:      Effort: Pulmonary effort is normal. No respiratory distress.      Breath sounds: Normal breath sounds.   Musculoskeletal:         General: Normal range of motion.      Cervical back: Normal range of motion and neck supple.   Skin:     General: Skin is warm and dry.   Neurological:      Mental Status: She is alert and oriented to person, place, and time.   Psychiatric:         Mood and Affect: Mood normal.          Current Outpatient Medications:     amLODIPine (NORVASC) 10 MG tablet, Take 1 tablet by mouth once daily, Disp: 90 tablet, Rfl: 0    atorvastatin (LIPITOR) 40 MG tablet, Take 1 tablet by mouth once daily, Disp: 90 tablet, Rfl: 0    benazepriL (LOTENSIN) 40 MG tablet, Take 1 tablet by mouth once daily, Disp: 90 tablet, Rfl: 0    dulaglutide (TRULICITY) 1.5 mg/0.5 mL pen injector, Inject 1.5 mg into the skin every 7 days., Disp: 4 pen, Rfl: 11    glipiZIDE (GLUCOTROL) 5 MG tablet, Take 1 tablet (5 mg total) by mouth 2 (two) times daily before meals., Disp: 60 tablet, Rfl: 11    hydroCHLOROthiazide (HYDRODIURIL) 25 MG tablet, Take 1 tablet by mouth once daily, Disp: 90 tablet, Rfl: 0    JARDIANCE 25 mg tablet, TAKE 1 TABLET BY MOUTH IN THE MORNING, Disp: 90 tablet, Rfl: 0    levothyroxine (SYNTHROID) 50 MCG tablet, Take 1 tablet (50 mcg total) by mouth before  breakfast., Disp: 30 tablet, Rfl: 11    metoprolol succinate (TOPROL-XL) 25 MG 24 hr tablet, Take 1 tablet by mouth once daily, Disp: 90 tablet, Rfl: 4    oxybutynin (DITROPAN) 5 MG Tab, Take 1 tablet (5 mg total) by mouth every evening., Disp: 30 tablet, Rfl: 11    calcium carbonate (OS-MEKA) 600 mg calcium (1,500 mg) Tab, Take 600 mg by mouth once daily., Disp: , Rfl:     ibuprofen (ADVIL,MOTRIN) 800 MG tablet, Take 800 mg by mouth every 6 (six) hours as needed., Disp: , Rfl:     penicillin v potassium (VEETID) 500 MG tablet, Take 500 mg by mouth 4 (four) times daily., Disp: , Rfl:     Assessment & Plan:     Problem List Items Addressed This Visit          Other    Pre-op exam - Primary     EKG performed today: yes  Preoperative Labs ordered: yes  Medications adjusted: hold fluid pills and DM meds the morning of procedure  Will medically clear for surgery pending labs   Pt is agreeable to plan and verbalizes understanding             Relevant Orders    CBC Auto Differential    Comprehensive Metabolic Panel    Urinalysis, Reflex to Urine Culture    SCHEDULED EKG 12-LEAD (to Muse)

## 2023-06-05 NOTE — ASSESSMENT & PLAN NOTE
EKG performed today: yes  Preoperative Labs ordered: yes  Medications adjusted: hold fluid pills and DM meds the morning of procedure  Will medically clear for surgery pending labs   Pt is agreeable to plan and verbalizes understanding

## 2023-06-07 DIAGNOSIS — I15.2 HYPERTENSION ASSOCIATED WITH TYPE 2 DIABETES MELLITUS: ICD-10-CM

## 2023-06-07 DIAGNOSIS — E11.65 TYPE 2 DIABETES MELLITUS WITH HYPERGLYCEMIA, WITHOUT LONG-TERM CURRENT USE OF INSULIN: ICD-10-CM

## 2023-06-07 DIAGNOSIS — E11.59 HYPERTENSION ASSOCIATED WITH TYPE 2 DIABETES MELLITUS: ICD-10-CM

## 2023-06-08 RX ORDER — HYDROCHLOROTHIAZIDE 25 MG/1
TABLET ORAL
Qty: 90 TABLET | Refills: 0 | Status: SHIPPED | OUTPATIENT
Start: 2023-06-08 | End: 2023-09-11

## 2023-06-08 RX ORDER — BENAZEPRIL HYDROCHLORIDE 40 MG/1
TABLET ORAL
Qty: 90 TABLET | Refills: 0 | Status: SHIPPED | OUTPATIENT
Start: 2023-06-08 | End: 2023-09-19

## 2023-06-13 ENCOUNTER — TELEPHONE (OUTPATIENT)
Dept: FAMILY MEDICINE | Facility: CLINIC | Age: 78
End: 2023-06-13
Payer: MEDICARE

## 2023-06-13 NOTE — TELEPHONE ENCOUNTER
----- Message from Cassie Waters LPN sent at 6/13/2023 10:08 AM CDT -----  Regarding: FW: Medical release    ----- Message -----  From: Emma Cifuentes MA  Sent: 6/12/2023   2:15 PM CDT  To: Cassie Waters LPN  Subject: FW: Medical release                                ----- Message -----  From: Cora Magdaleno  Sent: 6/12/2023   1:34 PM CDT  To: Heather Gary Staff  Subject: Medical release                                  .Type:  Needs Medical Advice    Who Called: [facility]   Symptoms (please be specific):    How long has patient had these symptoms:    Pharmacy name and phone #:    Would the patient rather a call back or a response via [facility]? Call back  Best Call Back Number: [phone]  Additional Information: Pls f/u when medical clearance is received, clinic will be faxing over again..

## 2023-06-30 DIAGNOSIS — E78.5 HYPERLIPIDEMIA ASSOCIATED WITH TYPE 2 DIABETES MELLITUS: ICD-10-CM

## 2023-06-30 DIAGNOSIS — E11.69 HYPERLIPIDEMIA ASSOCIATED WITH TYPE 2 DIABETES MELLITUS: ICD-10-CM

## 2023-06-30 RX ORDER — ATORVASTATIN CALCIUM 40 MG/1
TABLET, FILM COATED ORAL
Qty: 90 TABLET | Refills: 0 | Status: SHIPPED | OUTPATIENT
Start: 2023-06-30 | End: 2023-09-26

## 2023-07-20 DIAGNOSIS — E03.9 HYPOTHYROIDISM, UNSPECIFIED TYPE: Primary | ICD-10-CM

## 2023-07-24 RX ORDER — LEVOTHYROXINE SODIUM 50 UG/1
TABLET ORAL
Qty: 90 TABLET | Refills: 0 | Status: SHIPPED | OUTPATIENT
Start: 2023-07-24 | End: 2023-10-16

## 2023-07-28 DIAGNOSIS — E11.59 HYPERTENSION ASSOCIATED WITH TYPE 2 DIABETES MELLITUS: ICD-10-CM

## 2023-07-28 DIAGNOSIS — I15.2 HYPERTENSION ASSOCIATED WITH TYPE 2 DIABETES MELLITUS: ICD-10-CM

## 2023-07-31 DIAGNOSIS — E11.59 HYPERTENSION ASSOCIATED WITH TYPE 2 DIABETES MELLITUS: ICD-10-CM

## 2023-07-31 DIAGNOSIS — I15.2 HYPERTENSION ASSOCIATED WITH TYPE 2 DIABETES MELLITUS: ICD-10-CM

## 2023-07-31 RX ORDER — AMLODIPINE BESYLATE 10 MG/1
TABLET ORAL
Qty: 90 TABLET | Refills: 0 | Status: SHIPPED | OUTPATIENT
Start: 2023-07-31 | End: 2023-07-31

## 2023-07-31 RX ORDER — AMLODIPINE BESYLATE 10 MG/1
TABLET ORAL
Qty: 90 TABLET | Refills: 2 | Status: SHIPPED | OUTPATIENT
Start: 2023-07-31

## 2023-08-08 ENCOUNTER — OFFICE VISIT (OUTPATIENT)
Dept: FAMILY MEDICINE | Facility: CLINIC | Age: 78
End: 2023-08-08
Payer: MEDICARE

## 2023-08-08 VITALS
HEIGHT: 62 IN | OXYGEN SATURATION: 98 % | SYSTOLIC BLOOD PRESSURE: 113 MMHG | HEART RATE: 76 BPM | TEMPERATURE: 98 F | WEIGHT: 93.38 LBS | DIASTOLIC BLOOD PRESSURE: 65 MMHG | RESPIRATION RATE: 18 BRPM | BODY MASS INDEX: 17.19 KG/M2

## 2023-08-08 DIAGNOSIS — I15.2 HYPERTENSION ASSOCIATED WITH TYPE 2 DIABETES MELLITUS: ICD-10-CM

## 2023-08-08 DIAGNOSIS — E11.59 HYPERTENSION ASSOCIATED WITH TYPE 2 DIABETES MELLITUS: ICD-10-CM

## 2023-08-08 DIAGNOSIS — E83.52 HYPERCALCEMIA: ICD-10-CM

## 2023-08-08 DIAGNOSIS — Z00.00 WELLNESS EXAMINATION: Primary | ICD-10-CM

## 2023-08-08 DIAGNOSIS — E11.65 TYPE 2 DIABETES MELLITUS WITH HYPERGLYCEMIA, WITHOUT LONG-TERM CURRENT USE OF INSULIN: ICD-10-CM

## 2023-08-08 DIAGNOSIS — E03.9 ACQUIRED HYPOTHYROIDISM: ICD-10-CM

## 2023-08-08 DIAGNOSIS — E11.69 HYPERLIPIDEMIA ASSOCIATED WITH TYPE 2 DIABETES MELLITUS: ICD-10-CM

## 2023-08-08 DIAGNOSIS — E78.5 HYPERLIPIDEMIA ASSOCIATED WITH TYPE 2 DIABETES MELLITUS: ICD-10-CM

## 2023-08-08 DIAGNOSIS — N18.31 CHRONIC KIDNEY DISEASE, STAGE 3A: ICD-10-CM

## 2023-08-08 PROBLEM — M85.80 OSTEOPENIA: Status: ACTIVE | Noted: 2023-08-08

## 2023-08-08 PROCEDURE — G0439 PPPS, SUBSEQ VISIT: HCPCS | Mod: ,,, | Performed by: NURSE PRACTITIONER

## 2023-08-08 PROCEDURE — G0439 PR MEDICARE ANNUAL WELLNESS SUBSEQUENT VISIT: ICD-10-PCS | Mod: ,,, | Performed by: NURSE PRACTITIONER

## 2023-08-08 NOTE — ASSESSMENT & PLAN NOTE
Most recent A1c 8.5  DM eye exam up-to-date (01/2023)  Microalbumin up-to-date (01/2023)  Continue current meds  Labs this week  Keep appointment with PCP for follow-up

## 2023-08-08 NOTE — ASSESSMENT & PLAN NOTE
Labs due and ordered  MMG due soon: scheduled 8/14/2023  DEXA 8/2022; Osteopenia  Colon cancer screening:  no longer due  Tdap up to date (10/2022)  DM eye exam up to date (1/2023)  Pneumovax up to date  Shingles up to date  PAP:  Hysterectomy

## 2023-08-08 NOTE — ASSESSMENT & PLAN NOTE
She has discontinued Ca+ supplements  Will repeat labs; if still elevated, we can decrease or d/c HCTZ  Verbalizes understanding

## 2023-08-08 NOTE — PROGRESS NOTES
Patient ID: [id number]     Chief Complaint: Medicare Wellness      HPI:     Christina Car is a 78 y.o. female here today for a Medicare Wellness.     Labs due and ordered  MMG due soon: scheduled 8/14/2023  DEXA 8/2022; Osteopenia  Colon cancer screening:  no longer due  Tdap up to date (10/2022)  DM eye exam up to date (1/2023)  Pneumovax up to date  Shingles up to date  PAP:  Hysterectomy      Opioid Screening: Patient medication list reviewed, patient is not taking prescription opioids. Patient is not using additional opioids than prescribed. Patient is at low risk of substance abuse based on this opioid use history.       ----------------------------  Carpal tunnel syndrome  Chronic kidney disease, stage 3a  HLD (hyperlipidemia)  HTN (hypertension)  Hyperlipidemia associated with type 2 diabetes mellitus  Hypothyroidism, unspecified  Osteopenia  Type 2 diabetes mellitus with hyperglycemia  Type 2 diabetes mellitus without complications     Past Surgical History:   Procedure Laterality Date    APPENDECTOMY  2014    biopsy of breast  1994    CATARACT EXTRACTION Bilateral 2018    HYSTERECTOMY         Review of patient's allergies indicates:  No Known Allergies    Outpatient Medications Marked as Taking for the 8/8/23 encounter (Office Visit) with Angelique Smith NP   Medication Sig Dispense Refill    amLODIPine (NORVASC) 10 MG tablet Take 1 tablet by mouth once daily 90 tablet 2    atorvastatin (LIPITOR) 40 MG tablet Take 1 tablet by mouth once daily 90 tablet 0    benazepriL (LOTENSIN) 40 MG tablet Take 1 tablet by mouth once daily 90 tablet 0    dulaglutide (TRULICITY) 1.5 mg/0.5 mL pen injector Inject 1.5 mg into the skin every 7 days. 4 pen 11    glipiZIDE (GLUCOTROL) 5 MG tablet Take 1 tablet (5 mg total) by mouth 2 (two) times daily before meals. 60 tablet 11    hydroCHLOROthiazide (HYDRODIURIL) 25 MG tablet Take 1 tablet by mouth once daily 90 tablet 0    JARDIANCE 25 mg tablet TAKE 1 TABLET BY  MOUTH IN THE MORNING 90 tablet 0    levothyroxine (SYNTHROID) 50 MCG tablet TAKE 1 TABLET BY MOUTH ONCE DAILY BEFORE BREAKFAST 90 tablet 0    metoprolol succinate (TOPROL-XL) 25 MG 24 hr tablet Take 1 tablet by mouth once daily 90 tablet 4    oxybutynin (DITROPAN) 5 MG Tab Take 1 tablet (5 mg total) by mouth every evening. 30 tablet 11       Social History     Socioeconomic History    Marital status:    Tobacco Use    Smoking status: Never     Passive exposure: Never    Smokeless tobacco: Never   Substance and Sexual Activity    Alcohol use: Never    Drug use: Never        History reviewed. No pertinent family history.     Patient Care Team:  Alfie Mendiola MD as PCP - General (Internal Medicine)  Alfie Mendiola MD       Subjective:     Review of Systems   All other systems reviewed and are negative.        Patient Reported Health Risk Assessment  [age] 70-79  Are you male or female?: Female  During the past four weeks, how much have you been bothered by emotional problems such as feeling anxious, depressed, irritable, sad, or downhearted and blue?: Slightly  During the past five weeks, has your physical and/or emotional health limited your social activities with family, friends, neighbors, or groups?: Slightly  During the past four weeks, how much bodily pain have you generally had?: Very mild pain  During the past four weeks, was someone available to help if you needed and wanted help?: Yes, as much as I wanted  During the past four weeks, what was the hardest physical activity you could do for at least two minutes?: Very light  Can you get to places out of walking distance without help?  (For example, can you travel alone on buses or taxis, or drive your own car?): Yes  Can you go shopping for groceries or clothes without someone's help?: Yes  Can you prepare your own meals?: Yes  Can you do your own housework without help?: Yes  Because of any health problems, do you need the help of  another person with your personal care needs such as eating, bathing, dressing, or getting around the house?: No  Can you handle your own money without help?: Yes  During the past four weeks, how would you rate your health in general?: Good  How have things been going for you during the past four weeks?: Pretty well  Are you having difficulties driving your car?: No  Do you always fasten your seat belt when you are in a car?: Yes, usually  How often in the past four weeks have you been bothered by falling or dizzy when standing up?: Never  How often in the past four weeks have you been bothered by sexual problems?: Always  How often in the past four weeks have you been bothered by trouble eating well?: Often  How often in the past four weeks have you been bothered by teeth or denture problems?: Often  How often in the past four weeks have you been bothered with problems using the telephone?: Never  How often in the past four weeks have you been bothered by tiredness or fatigue?: Seldom  Have you fallen two or more times in the past year?: No  Are you afraid of falling?: No  Are you a smoker?: No  During the past four weeks, how many drinks of wine, beer, or other alcoholic beverages did you have?: No alcohol at all  Do you exercise for about 20 minutes three or more days a week?: Yes, some of the time  Have you been given any information to help you with hazards in your house that might hurt you?: No  Have you been given any information to help you with keeping track of your medications?: No  How often do you have trouble taking medicines the way you've been told to take them?: I always take them as prescribed  How confident are you that you can control and manage most of your health problems?: Very confident  What is your race? (Check all that apply.):     Objective:     /65 (BP Location: Right arm, Patient Position: Sitting, BP Method: Small (Automatic))   Pulse 76   Temp 98.3 °F (36.8 °C) (Oral)    "Resp 18   Ht 5' 2" (1.575 m)   Wt 42.4 kg (93 lb 6.4 oz)   SpO2 98%   BMI 17.08 kg/m²     Physical Exam  Vitals reviewed.   Constitutional:       Appearance: Normal appearance.   HENT:      Head: Normocephalic.      Mouth/Throat:      Mouth: Mucous membranes are moist.   Eyes:      Conjunctiva/sclera: Conjunctivae normal.      Pupils: Pupils are equal, round, and reactive to light.   Cardiovascular:      Rate and Rhythm: Normal rate and regular rhythm.   Pulmonary:      Effort: Pulmonary effort is normal. No respiratory distress.      Breath sounds: Normal breath sounds.   Abdominal:      General: Bowel sounds are normal. There is no distension.      Palpations: Abdomen is soft.   Musculoskeletal:         General: Normal range of motion.      Cervical back: Normal range of motion and neck supple.   Skin:     General: Skin is warm and dry.   Neurological:      Mental Status: She is alert and oriented to person, place, and time.   Psychiatric:         Mood and Affect: Mood normal.           No flowsheet data found.  Fall Risk Assessment - Outpatient 8/8/2023 6/5/2023 2/17/2023 9/28/2022 7/19/2022   Mobility Status Ambulatory Ambulatory Ambulatory Ambulatory Ambulatory   Number of falls 0 1 0 0 0   Identified as fall risk 0 0 0 0 0           Depression Screening  Over the past two weeks, has the patient felt down, depressed, or hopeless?: No  Over the past two weeks, has the patient felt little interest or pleasure in doing things?: No  Functional Ability/Safety Screening  Was the patient's timed Up & Go test unsteady or longer than 30 seconds?: No  Does the patient need help with phone, transportation, shopping, preparing meals, housework, laundry, meds, or managing money?: No  Does the patient's home have rugs in the hallway, lack grab bars in the bathroom, lack handrails on the stairs or have poor lighting?: No  Have you noticed any hearing difficulties?: No  Cognitive Function (Assessed through direct " observation with due consideration of information obtained by way of patient reports and/or concerns raised by family, friends, caretakers, or others)    Does the patient repeat questions/statements in the same day?: No  Does the patient have trouble remembering the date, year, and time?: No  Does the patient have difficulty managing finances?: No  Does the patient have a decreased sense of direction?: No  Assessment/Plan:     1. Wellness examination  Assessment & Plan:  Labs due and ordered  MMG due soon: scheduled 8/14/2023  DEXA 8/2022; Osteopenia  Colon cancer screening:  no longer due  Tdap up to date (10/2022)  DM eye exam up to date (1/2023)  Pneumovax up to date  Shingles up to date  PAP:  Hysterectomy    Orders:  -     Comprehensive Metabolic Panel; Future; Expected date: 08/08/2023  -     Lipid Panel; Future; Expected date: 08/08/2023  -     TSH; Future; Expected date: 08/08/2023  -     Hemoglobin A1C; Future; Expected date: 08/08/2023    2. Type 2 diabetes mellitus with hyperglycemia, without long-term current use of insulin  Assessment & Plan:  Most recent A1c 8.5  DM eye exam up-to-date (01/2023)  Microalbumin up-to-date (01/2023)  Continue current meds  Labs this week  Keep appointment with PCP for follow-up    Orders:  -     Comprehensive Metabolic Panel; Future; Expected date: 08/08/2023  -     Lipid Panel; Future; Expected date: 08/08/2023  -     TSH; Future; Expected date: 08/08/2023  -     Hemoglobin A1C; Future; Expected date: 08/08/2023    3. Hyperlipidemia associated with type 2 diabetes mellitus  Assessment & Plan:  Stable   Continue statin  Keep on with PCP follow-up      4. Acquired hypothyroidism  Assessment & Plan:  Euthyroid  Continue Levothyroxine  Keep appointment with PCP for follow-up      5. Chronic kidney disease, stage 3a  Assessment & Plan:  Improved and stable   Keep appointment with PCP for follow-up      6. Hypertension associated with type 2 diabetes mellitus  Assessment &  Plan:  BP at goal  Continue current medication  Daily BP check; bring log all clinic visits  Instructed to report to ED for any BP greater than 200/100, dizziness, syncope, CP, or SOB  Keep appt. With PCP for follow up  Patient is agreeable to plan and verbalizes understanding          7. Hypercalcemia  Assessment & Plan:  She has discontinued Ca+ supplements  Will repeat labs; if still elevated, we can decrease or d/c HCTZ  Verbalizes understanding             Medicare Annual Wellness and Personalized Prevention Plan:   Fall Risk + Home Safety + Hearing Impairment + Depression Screen + Opioid and Substance Abuse Screening + Cognitive Impairment Screen + Health Risk Assessment all reviewed.     Health Maintenance Topics with due status: Not Due       Topic Last Completion Date    DEXA Scan 08/09/2022    TETANUS VACCINE 10/03/2022    Influenza Vaccine 10/03/2022    Eye Exam 01/23/2023    Diabetes Urine Screening 02/27/2023    Lipid Panel 02/27/2023      The patient's Health Maintenance was reviewed and the following appears to be due at this time:   Health Maintenance Due   Topic Date Due    COVID-19 Vaccine (6 - Moderna series) 12/21/2022    Hemoglobin A1c  08/27/2023       Advance Care Planning   I attest to discussing Advance Care Planning with patient and/or family member.  Education was provided including the importance of the Health Care Power of , Advance Directives, and/or [place] documentation.  The patient expressed understanding to the importance of this information and discussion.         Follow up in about 6 months (around 2/8/2024) for F/U with PCP. In addition to their scheduled follow up, the patient has also been instructed to follow up on as needed basis.

## 2023-08-09 ENCOUNTER — LAB VISIT (OUTPATIENT)
Dept: LAB | Facility: HOSPITAL | Age: 78
End: 2023-08-09
Attending: NURSE PRACTITIONER
Payer: MEDICARE

## 2023-08-09 DIAGNOSIS — Z00.00 WELLNESS EXAMINATION: ICD-10-CM

## 2023-08-09 DIAGNOSIS — E11.65 TYPE 2 DIABETES MELLITUS WITH HYPERGLYCEMIA, WITHOUT LONG-TERM CURRENT USE OF INSULIN: ICD-10-CM

## 2023-08-09 LAB
ALBUMIN SERPL-MCNC: 4.2 G/DL (ref 3.4–4.8)
ALBUMIN/GLOB SERPL: 1.3 RATIO (ref 1.1–2)
ALP SERPL-CCNC: 76 UNIT/L (ref 40–150)
ALT SERPL-CCNC: 26 UNIT/L (ref 0–55)
AST SERPL-CCNC: 21 UNIT/L (ref 5–34)
BILIRUB SERPL-MCNC: 0.9 MG/DL
BUN SERPL-MCNC: 9.2 MG/DL (ref 9.8–20.1)
CALCIUM SERPL-MCNC: 10.8 MG/DL (ref 8.4–10.2)
CHLORIDE SERPL-SCNC: 102 MMOL/L (ref 98–107)
CHOLEST SERPL-MCNC: 164 MG/DL
CHOLEST/HDLC SERPL: 2 {RATIO} (ref 0–5)
CO2 SERPL-SCNC: 27 MMOL/L (ref 23–31)
CREAT SERPL-MCNC: 0.89 MG/DL (ref 0.55–1.02)
EST. AVERAGE GLUCOSE BLD GHB EST-MCNC: 145.6 MG/DL
GFR SERPLBLD CREATININE-BSD FMLA CKD-EPI: >60 MLS/MIN/1.73/M2
GLOBULIN SER-MCNC: 3.2 GM/DL (ref 2.4–3.5)
GLUCOSE SERPL-MCNC: 148 MG/DL (ref 82–115)
HBA1C MFR BLD: 6.7 %
HDLC SERPL-MCNC: 75 MG/DL (ref 35–60)
LDLC SERPL CALC-MCNC: 73 MG/DL (ref 50–140)
POTASSIUM SERPL-SCNC: 3.3 MMOL/L (ref 3.5–5.1)
PROT SERPL-MCNC: 7.4 GM/DL (ref 5.8–7.6)
SODIUM SERPL-SCNC: 141 MMOL/L (ref 136–145)
TRIGL SERPL-MCNC: 81 MG/DL (ref 37–140)
TSH SERPL-ACNC: 2.46 UIU/ML (ref 0.35–4.94)
VLDLC SERPL CALC-MCNC: 16 MG/DL

## 2023-08-09 PROCEDURE — 80061 LIPID PANEL: CPT

## 2023-08-09 PROCEDURE — 36415 COLL VENOUS BLD VENIPUNCTURE: CPT

## 2023-08-09 PROCEDURE — 83036 HEMOGLOBIN GLYCOSYLATED A1C: CPT

## 2023-08-09 PROCEDURE — 84443 ASSAY THYROID STIM HORMONE: CPT

## 2023-08-09 PROCEDURE — 80053 COMPREHEN METABOLIC PANEL: CPT

## 2023-08-10 DIAGNOSIS — E83.52 HYPERCALCEMIA: Primary | ICD-10-CM

## 2023-08-10 NOTE — PROGRESS NOTES
Please inform    1) A1C 6.7; DM well controlled; continue current meds    2) thyroid, cholesterol, liver, kidney all normal    3) K+ is 3.3 (slightly low) and Calcium level is elevated 10.8    She currently taking HCTZ, which can cause elevated Ca+ and low K+  --If she is currently taking any calcium supplement?  IF so, please d/c  --Please instruct to D/C HCTZ  --Continue Metoprolol 25 mg po daily  --Continue Amlodipine 10 mg po daily  --Continue Benazepril 40 mg po daily  --Please monitor BP; If SBP >140 consistently, she may increase Benazepril to 80 mg     Please schedule f/u with me in 1 month for reevaluation of BP   Instruct to have labs done 1-2 days before appt

## 2023-08-14 LAB — BCS RECOMMENDATION EXT: NORMAL

## 2023-08-18 DIAGNOSIS — I15.2 HYPERTENSION ASSOCIATED WITH TYPE 2 DIABETES MELLITUS: Primary | ICD-10-CM

## 2023-08-18 DIAGNOSIS — E11.59 HYPERTENSION ASSOCIATED WITH TYPE 2 DIABETES MELLITUS: Primary | ICD-10-CM

## 2023-08-18 RX ORDER — METOPROLOL SUCCINATE 25 MG/1
25 TABLET, EXTENDED RELEASE ORAL DAILY
Qty: 90 TABLET | Refills: 3 | Status: SHIPPED | OUTPATIENT
Start: 2023-08-18

## 2023-08-23 ENCOUNTER — PATIENT OUTREACH (OUTPATIENT)
Dept: ADMINISTRATIVE | Facility: HOSPITAL | Age: 78
End: 2023-08-23
Payer: MEDICARE

## 2023-08-23 DIAGNOSIS — E11.65 TYPE 2 DIABETES MELLITUS WITH HYPERGLYCEMIA, WITHOUT LONG-TERM CURRENT USE OF INSULIN: ICD-10-CM

## 2023-08-23 DIAGNOSIS — E11.59 HYPERTENSION ASSOCIATED WITH TYPE 2 DIABETES MELLITUS: ICD-10-CM

## 2023-08-23 DIAGNOSIS — I15.2 HYPERTENSION ASSOCIATED WITH TYPE 2 DIABETES MELLITUS: ICD-10-CM

## 2023-08-23 RX ORDER — EMPAGLIFLOZIN 25 MG/1
TABLET, FILM COATED ORAL
Qty: 90 TABLET | Refills: 0 | Status: SHIPPED | OUTPATIENT
Start: 2023-08-23 | End: 2023-11-22

## 2023-08-23 NOTE — PROGRESS NOTES
Population Health. Out Reach.  The following record(s)  below were uploaded for Health Maintenance .    MAMMOGRAM SCREENING        8/14/2023

## 2023-09-07 ENCOUNTER — LAB VISIT (OUTPATIENT)
Dept: LAB | Facility: HOSPITAL | Age: 78
End: 2023-09-07
Attending: NURSE PRACTITIONER
Payer: MEDICARE

## 2023-09-07 DIAGNOSIS — E55.9 VITAMIN D DEFICIENCY: Primary | ICD-10-CM

## 2023-09-07 DIAGNOSIS — E83.52 HYPERCALCEMIA: ICD-10-CM

## 2023-09-07 LAB
ALBUMIN SERPL-MCNC: 4.2 G/DL (ref 3.4–4.8)
ALBUMIN/GLOB SERPL: 1.3 RATIO (ref 1.1–2)
ALP SERPL-CCNC: 73 UNIT/L (ref 40–150)
ALT SERPL-CCNC: 42 UNIT/L (ref 0–55)
AST SERPL-CCNC: 24 UNIT/L (ref 5–34)
BILIRUB SERPL-MCNC: 0.8 MG/DL
BUN SERPL-MCNC: 20.3 MG/DL (ref 9.8–20.1)
CALCIUM SERPL-MCNC: 10.6 MG/DL (ref 8.4–10.2)
CHLORIDE SERPL-SCNC: 104 MMOL/L (ref 98–107)
CO2 SERPL-SCNC: 27 MMOL/L (ref 23–31)
CREAT SERPL-MCNC: 1.34 MG/DL (ref 0.55–1.02)
GFR SERPLBLD CREATININE-BSD FMLA CKD-EPI: 41 MLS/MIN/1.73/M2
GLOBULIN SER-MCNC: 3.3 GM/DL (ref 2.4–3.5)
GLUCOSE SERPL-MCNC: 156 MG/DL (ref 82–115)
POTASSIUM SERPL-SCNC: 3.3 MMOL/L (ref 3.5–5.1)
PROT SERPL-MCNC: 7.5 GM/DL (ref 5.8–7.6)
SODIUM SERPL-SCNC: 143 MMOL/L (ref 136–145)

## 2023-09-07 PROCEDURE — 36415 COLL VENOUS BLD VENIPUNCTURE: CPT

## 2023-09-07 PROCEDURE — 80053 COMPREHEN METABOLIC PANEL: CPT

## 2023-09-08 DIAGNOSIS — I15.2 HYPERTENSION ASSOCIATED WITH TYPE 2 DIABETES MELLITUS: ICD-10-CM

## 2023-09-08 DIAGNOSIS — E11.65 TYPE 2 DIABETES MELLITUS WITH HYPERGLYCEMIA, WITHOUT LONG-TERM CURRENT USE OF INSULIN: ICD-10-CM

## 2023-09-08 DIAGNOSIS — E11.59 HYPERTENSION ASSOCIATED WITH TYPE 2 DIABETES MELLITUS: ICD-10-CM

## 2023-09-11 RX ORDER — HYDROCHLOROTHIAZIDE 25 MG/1
TABLET ORAL
Qty: 90 TABLET | Refills: 0 | Status: SHIPPED | OUTPATIENT
Start: 2023-09-11 | End: 2023-09-12 | Stop reason: ALTCHOICE

## 2023-09-12 ENCOUNTER — OFFICE VISIT (OUTPATIENT)
Dept: FAMILY MEDICINE | Facility: CLINIC | Age: 78
End: 2023-09-12
Payer: MEDICARE

## 2023-09-12 VITALS
TEMPERATURE: 97 F | RESPIRATION RATE: 20 BRPM | DIASTOLIC BLOOD PRESSURE: 64 MMHG | SYSTOLIC BLOOD PRESSURE: 113 MMHG | HEART RATE: 71 BPM | BODY MASS INDEX: 18.58 KG/M2 | HEIGHT: 62 IN | WEIGHT: 101 LBS | OXYGEN SATURATION: 98 %

## 2023-09-12 DIAGNOSIS — I10 PRIMARY HYPERTENSION: ICD-10-CM

## 2023-09-12 DIAGNOSIS — E87.6 HYPOKALEMIA: ICD-10-CM

## 2023-09-12 DIAGNOSIS — E83.52 HYPERCALCEMIA: Primary | ICD-10-CM

## 2023-09-12 DIAGNOSIS — N17.9 AKI (ACUTE KIDNEY INJURY): ICD-10-CM

## 2023-09-12 PROCEDURE — 99214 OFFICE O/P EST MOD 30 MIN: CPT | Mod: ,,, | Performed by: NURSE PRACTITIONER

## 2023-09-12 PROCEDURE — 99214 PR OFFICE/OUTPT VISIT, EST, LEVL IV, 30-39 MIN: ICD-10-PCS | Mod: ,,, | Performed by: NURSE PRACTITIONER

## 2023-09-12 NOTE — ASSESSMENT & PLAN NOTE
BP at goal (low, normal)  Currently taking HCTZ 25 mg po daily, Benazepril 40 mg po daily, Amlodipine 10 mg po daily, and Metoprolol XL 25 mg po daily  Stop HCTZ (hypercalcemia; TANNER, hypocalcemia)  Continue Benazepril, Norvasc, and Metoprolol  Daily BP check; call me if SBP >140  RTC in 1 week for repeat labs

## 2023-09-12 NOTE — PROGRESS NOTES
Subjective:      Patient ID: Christina Car is a 78 y.o. White female      Chief Complaint: BP F/U and Lab Results       Past Medical History:   Diagnosis Date    Carpal tunnel syndrome     Chronic kidney disease, stage 3a 7/19/2022    HLD (hyperlipidemia)     HTN (hypertension)     Hyperlipidemia associated with type 2 diabetes mellitus 7/19/2022    Hypothyroidism, unspecified     Osteopenia     Type 2 diabetes mellitus with hyperglycemia 7/19/2022    Type 2 diabetes mellitus without complications         HPI  Presents to clinic for follow up lab results.     Hypercalcemia persistently elevated (10.6).  Hypokalemia (K+ 3.3) noted, and Kidney function declined (Creatinine 1.34).    HTN:  Currently taking HCTZ 25 mg po daily, Benazepril 40 mg po daily, Amlodipine 10 mg po daily, and Metoprolol XL 25 mg po daily. BP at goal.  Denies any headaches, weakness, dizziness, CP, or SOB.          Patient Care Team:  Alfie Mendiola MD as PCP - General (Internal Medicine)  Alfie Mendiola MD      Review of Systems   Constitutional: Negative.  Negative for appetite change, chills and fever.   HENT: Negative.     Eyes: Negative.  Negative for discharge and redness.   Respiratory: Negative.  Negative for shortness of breath.    Cardiovascular: Negative.  Negative for chest pain.   Gastrointestinal: Negative.    Endocrine: Negative.    Genitourinary: Negative.    Integumentary:  Negative.   Allergic/Immunologic: Negative.    Neurological: Negative.    Psychiatric/Behavioral: Negative.     All other systems reviewed and are negative.          Objective:      Vitals:    09/12/23 0932   BP: 113/64   Pulse: 71   Resp: 20   Temp: 97.4 °F (36.3 °C)      Body mass index is 18.47 kg/m².     Physical Exam  Vitals reviewed.   Constitutional:       Appearance: Normal appearance.   HENT:      Head: Normocephalic.      Mouth/Throat:      Mouth: Mucous membranes are moist.   Eyes:      Conjunctiva/sclera: Conjunctivae normal.      Pupils:  Pupils are equal, round, and reactive to light.   Cardiovascular:      Rate and Rhythm: Normal rate and regular rhythm.   Pulmonary:      Effort: Pulmonary effort is normal. No respiratory distress.      Breath sounds: Normal breath sounds.   Musculoskeletal:         General: Normal range of motion.      Cervical back: Normal range of motion and neck supple.   Skin:     General: Skin is warm and dry.   Neurological:      Mental Status: She is alert and oriented to person, place, and time.   Psychiatric:         Mood and Affect: Mood normal.            Current Outpatient Medications:     amLODIPine (NORVASC) 10 MG tablet, Take 1 tablet by mouth once daily, Disp: 90 tablet, Rfl: 2    atorvastatin (LIPITOR) 40 MG tablet, Take 1 tablet by mouth once daily, Disp: 90 tablet, Rfl: 0    benazepriL (LOTENSIN) 40 MG tablet, Take 1 tablet by mouth once daily, Disp: 90 tablet, Rfl: 0    dulaglutide (TRULICITY) 1.5 mg/0.5 mL pen injector, Inject 1.5 mg into the skin every 7 days., Disp: 4 pen, Rfl: 11    glipiZIDE (GLUCOTROL) 5 MG tablet, Take 1 tablet (5 mg total) by mouth 2 (two) times daily before meals., Disp: 60 tablet, Rfl: 11    ibuprofen (ADVIL,MOTRIN) 800 MG tablet, Take 800 mg by mouth every 6 (six) hours as needed., Disp: , Rfl:     JARDIANCE 25 mg tablet, TAKE 1 TABLET BY MOUTH IN THE MORNING, Disp: 90 tablet, Rfl: 0    levothyroxine (SYNTHROID) 50 MCG tablet, TAKE 1 TABLET BY MOUTH ONCE DAILY BEFORE BREAKFAST, Disp: 90 tablet, Rfl: 0    metoprolol succinate (TOPROL-XL) 25 MG 24 hr tablet, Take 1 tablet (25 mg total) by mouth once daily., Disp: 90 tablet, Rfl: 3    oxybutynin (DITROPAN) 5 MG Tab, Take 1 tablet (5 mg total) by mouth every evening., Disp: 30 tablet, Rfl: 11    Assessment & Plan:     Problem List Items Addressed This Visit          Cardiac/Vascular    Primary hypertension     BP at goal (low, normal)  Currently taking HCTZ 25 mg po daily, Benazepril 40 mg po daily, Amlodipine 10 mg po daily, and  Metoprolol XL 25 mg po daily  Stop HCTZ (hypercalcemia; TANNER, hypocalcemia)  Continue Benazepril, Norvasc, and Metoprolol  Daily BP check; call me if SBP >140  RTC in 1 week for repeat labs              Renal/    Hypercalcemia - Primary     Ca+ chronically elevated; PTH normal  Stop HCTZ  No longer taking Ca+ supplements  Repeat labs in 1 week         Relevant Orders    Comprehensive Metabolic Panel    Hypokalemia     K 3.3; hypercalcemia and TANNER noted  D/C HCTZ  RTC in 1 week for reevaluation; labs same day         Relevant Orders    Comprehensive Metabolic Panel    TANNER (acute kidney injury)     K 3.3; hypercalcemia and TANNER noted  D/C HCTZ   RTC in 1 week for reevaluation; labs same day           Relevant Orders    Comprehensive Metabolic Panel        Prior to the patient's arrival on the same day, I spent (7) minutes reviewing chart. Once in the exam room with the patient, I spent (18 ) minutes in the room with the member performing a history and exam as well as reviewing the test results and recommendations with the patient. After leaving the exam room, I spent an additional (5 ) minutes completing the electronic health record. The total time spent that day caring for the member is (30) minutes, and this time - including the breakdown - is documented in the medical record.

## 2023-09-18 DIAGNOSIS — E11.59 HYPERTENSION ASSOCIATED WITH TYPE 2 DIABETES MELLITUS: ICD-10-CM

## 2023-09-18 DIAGNOSIS — E11.65 TYPE 2 DIABETES MELLITUS WITH HYPERGLYCEMIA, WITHOUT LONG-TERM CURRENT USE OF INSULIN: ICD-10-CM

## 2023-09-18 DIAGNOSIS — I15.2 HYPERTENSION ASSOCIATED WITH TYPE 2 DIABETES MELLITUS: ICD-10-CM

## 2023-09-19 ENCOUNTER — OFFICE VISIT (OUTPATIENT)
Dept: FAMILY MEDICINE | Facility: CLINIC | Age: 78
End: 2023-09-19
Payer: MEDICARE

## 2023-09-19 ENCOUNTER — LAB VISIT (OUTPATIENT)
Dept: LAB | Facility: HOSPITAL | Age: 78
End: 2023-09-19
Attending: NURSE PRACTITIONER
Payer: MEDICARE

## 2023-09-19 VITALS
RESPIRATION RATE: 19 BRPM | OXYGEN SATURATION: 98 % | DIASTOLIC BLOOD PRESSURE: 67 MMHG | BODY MASS INDEX: 18.58 KG/M2 | WEIGHT: 101 LBS | TEMPERATURE: 98 F | SYSTOLIC BLOOD PRESSURE: 121 MMHG | HEART RATE: 74 BPM | HEIGHT: 62 IN

## 2023-09-19 DIAGNOSIS — I10 PRIMARY HYPERTENSION: Primary | ICD-10-CM

## 2023-09-19 DIAGNOSIS — E87.6 HYPOKALEMIA: ICD-10-CM

## 2023-09-19 DIAGNOSIS — N17.9 AKI (ACUTE KIDNEY INJURY): ICD-10-CM

## 2023-09-19 DIAGNOSIS — E83.52 HYPERCALCEMIA: ICD-10-CM

## 2023-09-19 DIAGNOSIS — R74.01 TRANSAMINITIS: ICD-10-CM

## 2023-09-19 DIAGNOSIS — N18.31 CHRONIC KIDNEY DISEASE, STAGE 3A: ICD-10-CM

## 2023-09-19 LAB
ALBUMIN SERPL-MCNC: 3.9 G/DL (ref 3.4–4.8)
ALBUMIN/GLOB SERPL: 1.1 RATIO (ref 1.1–2)
ALP SERPL-CCNC: 79 UNIT/L (ref 40–150)
ALT SERPL-CCNC: 57 UNIT/L (ref 0–55)
AST SERPL-CCNC: 41 UNIT/L (ref 5–34)
BILIRUB SERPL-MCNC: 0.6 MG/DL
BUN SERPL-MCNC: 12.3 MG/DL (ref 9.8–20.1)
CALCIUM SERPL-MCNC: 9.7 MG/DL (ref 8.4–10.2)
CHLORIDE SERPL-SCNC: 108 MMOL/L (ref 98–107)
CO2 SERPL-SCNC: 24 MMOL/L (ref 23–31)
CREAT SERPL-MCNC: 1.13 MG/DL (ref 0.55–1.02)
GFR SERPLBLD CREATININE-BSD FMLA CKD-EPI: 50 MLS/MIN/1.73/M2
GLOBULIN SER-MCNC: 3.4 GM/DL (ref 2.4–3.5)
GLUCOSE SERPL-MCNC: 297 MG/DL (ref 82–115)
POTASSIUM SERPL-SCNC: 3.8 MMOL/L (ref 3.5–5.1)
PROT SERPL-MCNC: 7.3 GM/DL (ref 5.8–7.6)
SODIUM SERPL-SCNC: 143 MMOL/L (ref 136–145)

## 2023-09-19 PROCEDURE — 36415 COLL VENOUS BLD VENIPUNCTURE: CPT

## 2023-09-19 PROCEDURE — 99214 OFFICE O/P EST MOD 30 MIN: CPT | Mod: ,,, | Performed by: NURSE PRACTITIONER

## 2023-09-19 PROCEDURE — 80053 COMPREHEN METABOLIC PANEL: CPT

## 2023-09-19 PROCEDURE — 99214 PR OFFICE/OUTPT VISIT, EST, LEVL IV, 30-39 MIN: ICD-10-PCS | Mod: ,,, | Performed by: NURSE PRACTITIONER

## 2023-09-19 RX ORDER — BENAZEPRIL HYDROCHLORIDE 40 MG/1
TABLET ORAL
Qty: 90 TABLET | Refills: 0 | Status: SHIPPED | OUTPATIENT
Start: 2023-09-19 | End: 2023-12-18

## 2023-09-19 NOTE — PROGRESS NOTES
Subjective:      Patient ID: Christina Car is a 78 y.o. White female      Chief Complaint: Hypertension       Past Medical History:   Diagnosis Date    Carpal tunnel syndrome     Chronic kidney disease, stage 3a 7/19/2022    HLD (hyperlipidemia)     HTN (hypertension)     Hyperlipidemia associated with type 2 diabetes mellitus 7/19/2022    Hypothyroidism, unspecified     Osteopenia     Type 2 diabetes mellitus with hyperglycemia 7/19/2022    Type 2 diabetes mellitus without complications         HPI  Presents to clinic for follow up lab results.      Labs reviewed today.  HCTZ discontinued at previous visit.  Hypokalemia and hypercalcemia resolved.  Kidney function much improved; GFR 50; previously 41.      HTN:  Medication changes were made at previous.  HCTZ discontinued.  Currently taking Benazepril 40 mg po daily, Amlodipine 10 mg po daily, and Metoprolol XL 25 mg po daily. BP at goal.  Denies any headaches, weakness, dizziness, CP, or SOB        Patient Care Team:  Alfie Mendiola MD as PCP - General (Internal Medicine)  Alfie Mendiola MD      Review of Systems   Constitutional: Negative.  Negative for appetite change, chills and fever.   HENT: Negative.     Eyes: Negative.  Negative for discharge and redness.   Respiratory: Negative.  Negative for shortness of breath.    Cardiovascular: Negative.  Negative for chest pain.   Gastrointestinal: Negative.    Endocrine: Negative.    Genitourinary: Negative.    Integumentary:  Negative.   Allergic/Immunologic: Negative.    Neurological: Negative.    Psychiatric/Behavioral: Negative.     All other systems reviewed and are negative.          Objective:      Vitals:    09/19/23 0928   BP: 121/67   Pulse: 74   Resp: 19   Temp: 97.9 °F (36.6 °C)      Body mass index is 18.47 kg/m².     Physical Exam  Vitals reviewed.   Constitutional:       Appearance: Normal appearance.   HENT:      Head: Normocephalic.      Mouth/Throat:      Mouth: Mucous membranes are moist.    Eyes:      Conjunctiva/sclera: Conjunctivae normal.      Pupils: Pupils are equal, round, and reactive to light.   Cardiovascular:      Rate and Rhythm: Normal rate and regular rhythm.   Pulmonary:      Effort: Pulmonary effort is normal. No respiratory distress.      Breath sounds: Normal breath sounds.   Musculoskeletal:         General: Normal range of motion.      Cervical back: Normal range of motion and neck supple.   Skin:     General: Skin is warm and dry.   Neurological:      Mental Status: She is alert and oriented to person, place, and time.   Psychiatric:         Mood and Affect: Mood normal.            Current Outpatient Medications:     amLODIPine (NORVASC) 10 MG tablet, Take 1 tablet by mouth once daily, Disp: 90 tablet, Rfl: 2    atorvastatin (LIPITOR) 40 MG tablet, Take 1 tablet by mouth once daily, Disp: 90 tablet, Rfl: 0    benazepriL (LOTENSIN) 40 MG tablet, Take 1 tablet by mouth once daily, Disp: 90 tablet, Rfl: 0    dulaglutide (TRULICITY) 1.5 mg/0.5 mL pen injector, Inject 1.5 mg into the skin every 7 days., Disp: 4 pen, Rfl: 11    glipiZIDE (GLUCOTROL) 5 MG tablet, Take 1 tablet (5 mg total) by mouth 2 (two) times daily before meals., Disp: 60 tablet, Rfl: 11    JARDIANCE 25 mg tablet, TAKE 1 TABLET BY MOUTH IN THE MORNING, Disp: 90 tablet, Rfl: 0    levothyroxine (SYNTHROID) 50 MCG tablet, TAKE 1 TABLET BY MOUTH ONCE DAILY BEFORE BREAKFAST, Disp: 90 tablet, Rfl: 0    metoprolol succinate (TOPROL-XL) 25 MG 24 hr tablet, Take 1 tablet (25 mg total) by mouth once daily., Disp: 90 tablet, Rfl: 3    oxybutynin (DITROPAN) 5 MG Tab, Take 1 tablet (5 mg total) by mouth every evening., Disp: 30 tablet, Rfl: 11    Assessment & Plan:     Problem List Items Addressed This Visit          Cardiac/Vascular    Primary hypertension - Primary     BP at goal  Med management:  Continue current medication: Benazepril 40 mg po daily, Amlodipine 10 mg po daily, and Metoprolol XL 25 mg po daily  Of note;  hypokalemia/hypercalcemia resolved after d/c of HCTZ; kidney improvement as well  Daily BP check; bring log all clinic visits  Instructed to report to ED for any BP greater than 200/100, dizziness, syncope, CP, or SOB  Keep appt. With PCP for follow up  Patient is agreeable to plan and verbalizes understanding                Renal/    Chronic kidney disease, stage 3a     Improved after discontinuation of HCTZ  GFR 50; previously 41  Keep appt with PCP for follow up         Hypercalcemia     Resolved after discontinuation of HCTZ         Hypokalemia     Resolved after discontinuation of HCTZ            GI    Transaminitis     Denies alcohol or tylenol  Repeat in  3 months; if remains elevated, will obtain US  Hep C is negative  Verbalizes understanding             Prior to the patient's arrival on the same day, I spent (12) minutes reviewing chart. Once in the exam room with the patient, I spent (16 ) minutes in the room with the member performing a history and exam as well as reviewing the test results and recommendations with the patient. After leaving the exam room, I spent an additional (1 ) minutes completing the electronic health record. The total time spent that day caring for the member is (30) minutes, and this time - including the breakdown - is documented in the medical record.

## 2023-09-19 NOTE — ASSESSMENT & PLAN NOTE
Denies alcohol or tylenol  Repeat in  3 months; if remains elevated, will obtain US  Hep C is negative  Verbalizes understanding

## 2023-09-19 NOTE — ASSESSMENT & PLAN NOTE
BP at goal  Med management:  Continue current medication: Benazepril 40 mg po daily, Amlodipine 10 mg po daily, and Metoprolol XL 25 mg po daily  Of note; hypokalemia/hypercalcemia resolved after d/c of HCTZ; kidney improvement as well  Daily BP check; bring log all clinic visits  Instructed to report to ED for any BP greater than 200/100, dizziness, syncope, CP, or SOB  Keep appt. With PCP for follow up  Patient is agreeable to plan and verbalizes understanding

## 2023-10-16 DIAGNOSIS — E03.9 HYPOTHYROIDISM, UNSPECIFIED TYPE: ICD-10-CM

## 2023-10-16 RX ORDER — LEVOTHYROXINE SODIUM 50 UG/1
TABLET ORAL
Qty: 90 TABLET | Refills: 0 | Status: SHIPPED | OUTPATIENT
Start: 2023-10-16 | End: 2024-01-22

## 2023-11-13 PROBLEM — Z00.00 WELLNESS EXAMINATION: Status: RESOLVED | Noted: 2023-08-08 | Resolved: 2023-11-13

## 2023-11-22 DIAGNOSIS — E11.59 HYPERTENSION ASSOCIATED WITH TYPE 2 DIABETES MELLITUS: ICD-10-CM

## 2023-11-22 DIAGNOSIS — E11.65 TYPE 2 DIABETES MELLITUS WITH HYPERGLYCEMIA, WITHOUT LONG-TERM CURRENT USE OF INSULIN: ICD-10-CM

## 2023-11-22 DIAGNOSIS — I15.2 HYPERTENSION ASSOCIATED WITH TYPE 2 DIABETES MELLITUS: ICD-10-CM

## 2023-11-22 RX ORDER — EMPAGLIFLOZIN 25 MG/1
TABLET, FILM COATED ORAL
Qty: 90 TABLET | Refills: 0 | Status: SHIPPED | OUTPATIENT
Start: 2023-11-22 | End: 2023-12-21

## 2023-12-03 DIAGNOSIS — E11.65 TYPE 2 DIABETES MELLITUS WITH HYPERGLYCEMIA, WITHOUT LONG-TERM CURRENT USE OF INSULIN: ICD-10-CM

## 2023-12-04 RX ORDER — DULAGLUTIDE 1.5 MG/.5ML
1.5 INJECTION, SOLUTION SUBCUTANEOUS WEEKLY
Qty: 12 PEN | Refills: 0 | Status: SHIPPED | OUTPATIENT
Start: 2023-12-04 | End: 2024-03-05

## 2023-12-17 DIAGNOSIS — E11.65 TYPE 2 DIABETES MELLITUS WITH HYPERGLYCEMIA, WITHOUT LONG-TERM CURRENT USE OF INSULIN: ICD-10-CM

## 2023-12-17 DIAGNOSIS — I15.2 HYPERTENSION ASSOCIATED WITH TYPE 2 DIABETES MELLITUS: ICD-10-CM

## 2023-12-17 DIAGNOSIS — E11.59 HYPERTENSION ASSOCIATED WITH TYPE 2 DIABETES MELLITUS: ICD-10-CM

## 2023-12-18 PROBLEM — N17.9 AKI (ACUTE KIDNEY INJURY): Status: RESOLVED | Noted: 2023-09-12 | Resolved: 2023-12-18

## 2023-12-18 RX ORDER — BENAZEPRIL HYDROCHLORIDE 40 MG/1
TABLET ORAL
Qty: 90 TABLET | Refills: 0 | Status: SHIPPED | OUTPATIENT
Start: 2023-12-18 | End: 2024-03-20

## 2023-12-21 DIAGNOSIS — E11.59 HYPERTENSION ASSOCIATED WITH TYPE 2 DIABETES MELLITUS: ICD-10-CM

## 2023-12-21 DIAGNOSIS — I15.2 HYPERTENSION ASSOCIATED WITH TYPE 2 DIABETES MELLITUS: ICD-10-CM

## 2023-12-21 DIAGNOSIS — E11.65 TYPE 2 DIABETES MELLITUS WITH HYPERGLYCEMIA, WITHOUT LONG-TERM CURRENT USE OF INSULIN: ICD-10-CM

## 2023-12-21 RX ORDER — EMPAGLIFLOZIN 25 MG/1
TABLET, FILM COATED ORAL
Qty: 90 TABLET | Refills: 0 | Status: SHIPPED | OUTPATIENT
Start: 2023-12-21

## 2024-01-01 DIAGNOSIS — E78.5 HYPERLIPIDEMIA ASSOCIATED WITH TYPE 2 DIABETES MELLITUS: ICD-10-CM

## 2024-01-01 DIAGNOSIS — E11.69 HYPERLIPIDEMIA ASSOCIATED WITH TYPE 2 DIABETES MELLITUS: ICD-10-CM

## 2024-01-02 RX ORDER — ATORVASTATIN CALCIUM 40 MG/1
TABLET, FILM COATED ORAL
Qty: 90 TABLET | Refills: 2 | Status: SHIPPED | OUTPATIENT
Start: 2024-01-02

## 2024-01-22 DIAGNOSIS — E03.9 HYPOTHYROIDISM, UNSPECIFIED TYPE: ICD-10-CM

## 2024-01-22 RX ORDER — LEVOTHYROXINE SODIUM 50 UG/1
TABLET ORAL
Qty: 90 TABLET | Refills: 0 | Status: SHIPPED | OUTPATIENT
Start: 2024-01-22 | End: 2024-03-25

## 2024-02-01 DIAGNOSIS — N32.81 OVERACTIVE BLADDER: Primary | ICD-10-CM

## 2024-02-01 RX ORDER — OXYBUTYNIN CHLORIDE 5 MG/1
5 TABLET ORAL NIGHTLY
Qty: 30 TABLET | Refills: 11 | Status: SHIPPED | OUTPATIENT
Start: 2024-02-01

## 2024-02-08 DIAGNOSIS — E11.65 TYPE 2 DIABETES MELLITUS WITH HYPERGLYCEMIA, WITHOUT LONG-TERM CURRENT USE OF INSULIN: Primary | ICD-10-CM

## 2024-02-08 RX ORDER — GLIPIZIDE 5 MG/1
5 TABLET ORAL
Qty: 180 TABLET | Refills: 3 | Status: SHIPPED | OUTPATIENT
Start: 2024-02-08

## 2024-02-20 ENCOUNTER — OFFICE VISIT (OUTPATIENT)
Dept: FAMILY MEDICINE | Facility: CLINIC | Age: 79
End: 2024-02-20
Payer: MEDICARE

## 2024-02-20 VITALS
SYSTOLIC BLOOD PRESSURE: 112 MMHG | RESPIRATION RATE: 16 BRPM | TEMPERATURE: 98 F | BODY MASS INDEX: 18.74 KG/M2 | HEART RATE: 76 BPM | HEIGHT: 62 IN | WEIGHT: 101.81 LBS | OXYGEN SATURATION: 98 % | DIASTOLIC BLOOD PRESSURE: 66 MMHG

## 2024-02-20 DIAGNOSIS — E11.65 TYPE 2 DIABETES MELLITUS WITH HYPERGLYCEMIA, WITHOUT LONG-TERM CURRENT USE OF INSULIN: Primary | ICD-10-CM

## 2024-02-20 DIAGNOSIS — M85.80 OSTEOPENIA, UNSPECIFIED LOCATION: ICD-10-CM

## 2024-02-20 DIAGNOSIS — E11.69 HYPERLIPIDEMIA ASSOCIATED WITH TYPE 2 DIABETES MELLITUS: ICD-10-CM

## 2024-02-20 DIAGNOSIS — N18.31 CHRONIC KIDNEY DISEASE, STAGE 3A: ICD-10-CM

## 2024-02-20 DIAGNOSIS — E03.9 ACQUIRED HYPOTHYROIDISM: ICD-10-CM

## 2024-02-20 DIAGNOSIS — I15.2 HYPERTENSION ASSOCIATED WITH TYPE 2 DIABETES MELLITUS: ICD-10-CM

## 2024-02-20 DIAGNOSIS — E78.5 HYPERLIPIDEMIA ASSOCIATED WITH TYPE 2 DIABETES MELLITUS: ICD-10-CM

## 2024-02-20 DIAGNOSIS — E11.59 HYPERTENSION ASSOCIATED WITH TYPE 2 DIABETES MELLITUS: ICD-10-CM

## 2024-02-20 DIAGNOSIS — R74.01 TRANSAMINITIS: ICD-10-CM

## 2024-02-20 DIAGNOSIS — R92.30 DENSE BREAST TISSUE: ICD-10-CM

## 2024-02-20 DIAGNOSIS — Z12.31 ENCOUNTER FOR SCREENING MAMMOGRAM FOR BREAST CANCER: ICD-10-CM

## 2024-02-20 PROBLEM — I10 PRIMARY HYPERTENSION: Status: RESOLVED | Noted: 2023-09-12 | Resolved: 2024-02-20

## 2024-02-20 PROBLEM — Z01.818 PRE-OP EXAM: Status: RESOLVED | Noted: 2023-06-05 | Resolved: 2024-02-20

## 2024-02-20 PROBLEM — E83.52 HYPERCALCEMIA: Status: RESOLVED | Noted: 2023-08-08 | Resolved: 2024-02-20

## 2024-02-20 PROBLEM — E87.6 HYPOKALEMIA: Status: RESOLVED | Noted: 2023-09-12 | Resolved: 2024-02-20

## 2024-02-20 PROCEDURE — 99214 OFFICE O/P EST MOD 30 MIN: CPT | Mod: ,,, | Performed by: INTERNAL MEDICINE

## 2024-02-20 RX ORDER — CHOLECALCIFEROL (VITAMIN D3) 50 MCG
2000 TABLET ORAL DAILY
COMMUNITY

## 2024-02-20 RX ORDER — ASPIRIN 325 MG
100 TABLET, DELAYED RELEASE (ENTERIC COATED) ORAL DAILY
COMMUNITY

## 2024-02-20 RX ORDER — GLUCOSAMINE/CHONDRO SU A 500-400 MG
1 TABLET ORAL 3 TIMES DAILY
COMMUNITY

## 2024-02-20 RX ORDER — AMOXICILLIN 500 MG
1 CAPSULE ORAL DAILY
COMMUNITY

## 2024-02-20 RX ORDER — IBUPROFEN 100 MG/5ML
1000 SUSPENSION, ORAL (FINAL DOSE FORM) ORAL DAILY
COMMUNITY

## 2024-02-20 NOTE — PROGRESS NOTES
Subjective:      Patient ID: Christina Car is a 78 y.o. female.    Chief Complaint: Diabetes    HPI  Follow up visit  Last wellness 08/08/2023    Seeing Dr. Roldan for cat bite, L index finger  Dental implants in place now, 10 teeth pulled  BP med changed last year  She says she is not checking her sugar, not really watching her diet but she is compliant with medication      Chronic Medical Conditions:     Type 2 DM with hyperglycemia:  Current med:  Jardiance 25 mg daily + Trulicity 1.5 mg sub Q weekly + Glipizide 5 mg BID     Last A1c    Diet is high in rice/sweets, she tries but has trouble staying consistent  Eye exam: completed Feb 2023 Dr. Wyman   Missed appt in Jan 2024, advised today to reschedule  Foot exam 2/20/2024 no DN    HTN and HLD associated with Type 2 DM:  BP med changed last year because of issues with hypokalemia and hypercalcemia- resolved with d/c of HCTZ    Hypothyroidism:  Compliant with medication        CKD stage 3a: GFR 59  She avoids NSAID  Drink plenty of water     Osteopenia: no longer on calcium supplement since calcium was high           MMG ordered 8/2024   DEXA 8/2022; Osteopenia  Colon cancer screening:  no longer due  Tdap up to date (10/2022)  DM eye exam up to date (1/2023)  Pneumovax up to date  Shingles up to date  PAP:  Hysterectomy  Health Maintenance Due   Topic Date Due    Eye Exam  01/23/2024    Hemoglobin A1c  02/09/2024    Diabetes Urine Screening  02/27/2024     Review of Systems   Constitutional:  Negative for chills and fever.   HENT:  Negative for congestion, sinus pressure and sore throat.    Respiratory:  Negative for cough and shortness of breath.    Cardiovascular:  Negative for chest pain and palpitations.   Gastrointestinal:  Negative for abdominal pain and nausea.   Skin:  Negative for rash.       Objective:     Vitals:    02/20/24 0931 02/20/24 0933   BP: (!) 148/80 112/66  Comment: at home reading   BP Location: Left arm Left arm   Patient Position:  "Sitting Sitting   BP Method: Small (Automatic) Medium (Automatic)   Pulse: 76    Resp: 16    Temp: 98.3 °F (36.8 °C)    TempSrc: Temporal    SpO2: 98%    Weight: 46.2 kg (101 lb 12.8 oz)    Height: 5' 2" (1.575 m)      Physical Exam  Vitals and nursing note reviewed.   Constitutional:       General: She is not in acute distress.     Appearance: She is well-developed. She is not diaphoretic.   HENT:      Head: Normocephalic and atraumatic.   Eyes:      General:         Right eye: No discharge.         Left eye: No discharge.      Conjunctiva/sclera: Conjunctivae normal.      Pupils: Pupils are equal, round, and reactive to light.   Neck:      Thyroid: No thyromegaly.   Cardiovascular:      Rate and Rhythm: Normal rate and regular rhythm.      Heart sounds: Normal heart sounds. No murmur heard.  Pulmonary:      Effort: Pulmonary effort is normal. No respiratory distress.      Breath sounds: Normal breath sounds. No wheezing or rales.   Abdominal:      General: There is no distension.      Palpations: Abdomen is soft.      Tenderness: There is no abdominal tenderness.   Musculoskeletal:      Cervical back: Normal range of motion and neck supple.   Lymphadenopathy:      Cervical: No cervical adenopathy.   Skin:     General: Skin is warm and dry.   Neurological:      Mental Status: She is alert and oriented to person, place, and time.   Psychiatric:         Mood and Affect: Mood normal.         Behavior: Behavior normal.         Protective Sensation (w/ 10 gram monofilament):  Right: Intact  Left: Intact    Visual Inspection:  Normal -  Bilateral    Pedal Pulses:   Right: Present  Left: Present    Posterior Tibialis Pulses:   Right:Present  Left: Present    Assessment/Plan:     1. Type 2 diabetes mellitus with hyperglycemia, without long-term current use of insulin  -     Comprehensive Metabolic Panel; Future; Expected date: 02/20/2024  -     Lipid Panel; Future; Expected date: 02/20/2024  -     CBC Auto Differential; " Future; Expected date: 02/20/2024  -     Hemoglobin A1C; Future; Expected date: 02/20/2024  -     Microalbumin/Creatinine Ratio, Urine; Future; Expected date: 02/20/2024  -     TSH; Future; Expected date: 02/20/2024  Stable  Check A1c now  Continue jardiance, trulicity and glipizide  Advise patient to check FBG daily  Offered her CGM she declines  Work on low fat ADA diet   2. Hypertension associated with type 2 diabetes mellitus  -     Comprehensive Metabolic Panel; Future; Expected date: 02/20/2024  -     Lipid Panel; Future; Expected date: 02/20/2024  -     CBC Auto Differential; Future; Expected date: 02/20/2024  -     Hemoglobin A1C; Future; Expected date: 02/20/2024  -     Microalbumin/Creatinine Ratio, Urine; Future; Expected date: 02/20/2024  -     TSH; Future; Expected date: 02/20/2024  Stable BP controlled  Repeat labs now  Continue metoprolol, amlodipine and benazepril  3. Hyperlipidemia associated with type 2 diabetes mellitus  -     Comprehensive Metabolic Panel; Future; Expected date: 02/20/2024  -     Lipid Panel; Future; Expected date: 02/20/2024  -     CBC Auto Differential; Future; Expected date: 02/20/2024  -     Hemoglobin A1C; Future; Expected date: 02/20/2024  -     Microalbumin/Creatinine Ratio, Urine; Future; Expected date: 02/20/2024  -     TSH; Future; Expected date: 02/20/2024  Stable continue statin therapy and low fat ADA diet   4. Chronic kidney disease, stage 3a  -     Comprehensive Metabolic Panel; Future; Expected date: 02/20/2024  -     Lipid Panel; Future; Expected date: 02/20/2024  -     CBC Auto Differential; Future; Expected date: 02/20/2024  -     Hemoglobin A1C; Future; Expected date: 02/20/2024  -     Microalbumin/Creatinine Ratio, Urine; Future; Expected date: 02/20/2024  -     TSH; Future; Expected date: 02/20/2024  Stable avoid NSAID use and cox2 inhibitor use    5. Acquired hypothyroidism  -     Comprehensive Metabolic Panel; Future; Expected date: 02/20/2024  -     Lipid  Panel; Future; Expected date: 02/20/2024  -     CBC Auto Differential; Future; Expected date: 02/20/2024  -     Hemoglobin A1C; Future; Expected date: 02/20/2024  -     Microalbumin/Creatinine Ratio, Urine; Future; Expected date: 02/20/2024  -     TSH; Future; Expected date: 02/20/2024  Stable continue levothyroxine  6. Transaminitis  -     Comprehensive Metabolic Panel; Future; Expected date: 02/20/2024  -     Lipid Panel; Future; Expected date: 02/20/2024  -     CBC Auto Differential; Future; Expected date: 02/20/2024  -     Hemoglobin A1C; Future; Expected date: 02/20/2024  -     Microalbumin/Creatinine Ratio, Urine; Future; Expected date: 02/20/2024  -     TSH; Future; Expected date: 02/20/2024  LFT elevated last year, not repeated , no liver US on file  Repeat LFT now  7. Osteopenia, unspecified location  Patient not on calcium because of elevated levels  Continue vitamin D  8. Encounter for screening mammogram for breast cancer  -     Mammo Digital Screening Bilat w/ Roger; Future; Expected date: 08/14/2024  -     US Breast Bilateral Complete; Future; Expected date: 08/14/2024    9. Dense breast tissue  -     Mammo Digital Screening Bilat w/ Roger; Future; Expected date: 08/14/2024  -     US Breast Bilateral Complete; Future; Expected date: 08/14/2024       Follow up in about 3 months (around 5/20/2024) for Follow Up - Chronic Conditions.    This includes face to face time and non-face to face time preparing to see the patient (eg, review of tests), obtaining and/or reviewing separately obtained history, documenting clinical information in the electronic or other health record, independently interpreting results and communicating results to the patient/family/caregiver, or care coordinator.

## 2024-03-05 DIAGNOSIS — E11.65 TYPE 2 DIABETES MELLITUS WITH HYPERGLYCEMIA, WITHOUT LONG-TERM CURRENT USE OF INSULIN: ICD-10-CM

## 2024-03-05 RX ORDER — DULAGLUTIDE 1.5 MG/.5ML
1.5 INJECTION, SOLUTION SUBCUTANEOUS WEEKLY
Qty: 12 PEN | Refills: 0 | Status: SHIPPED | OUTPATIENT
Start: 2024-03-05 | End: 2024-03-25

## 2024-03-19 DIAGNOSIS — E11.59 HYPERTENSION ASSOCIATED WITH TYPE 2 DIABETES MELLITUS: ICD-10-CM

## 2024-03-19 DIAGNOSIS — I15.2 HYPERTENSION ASSOCIATED WITH TYPE 2 DIABETES MELLITUS: ICD-10-CM

## 2024-03-19 DIAGNOSIS — E11.65 TYPE 2 DIABETES MELLITUS WITH HYPERGLYCEMIA, WITHOUT LONG-TERM CURRENT USE OF INSULIN: ICD-10-CM

## 2024-03-20 RX ORDER — BENAZEPRIL HYDROCHLORIDE 40 MG/1
TABLET ORAL
Qty: 90 TABLET | Refills: 1 | Status: SHIPPED | OUTPATIENT
Start: 2024-03-20

## 2024-03-22 ENCOUNTER — LAB VISIT (OUTPATIENT)
Dept: LAB | Facility: HOSPITAL | Age: 79
End: 2024-03-22
Attending: INTERNAL MEDICINE
Payer: MEDICARE

## 2024-03-22 DIAGNOSIS — E78.5 HYPERLIPIDEMIA ASSOCIATED WITH TYPE 2 DIABETES MELLITUS: ICD-10-CM

## 2024-03-22 DIAGNOSIS — E11.69 HYPERLIPIDEMIA ASSOCIATED WITH TYPE 2 DIABETES MELLITUS: ICD-10-CM

## 2024-03-22 DIAGNOSIS — I15.2 HYPERTENSION ASSOCIATED WITH TYPE 2 DIABETES MELLITUS: ICD-10-CM

## 2024-03-22 DIAGNOSIS — E03.9 ACQUIRED HYPOTHYROIDISM: ICD-10-CM

## 2024-03-22 DIAGNOSIS — E11.59 HYPERTENSION ASSOCIATED WITH TYPE 2 DIABETES MELLITUS: ICD-10-CM

## 2024-03-22 DIAGNOSIS — R74.01 TRANSAMINITIS: ICD-10-CM

## 2024-03-22 DIAGNOSIS — N18.31 CHRONIC KIDNEY DISEASE, STAGE 3A: ICD-10-CM

## 2024-03-22 DIAGNOSIS — E11.65 TYPE 2 DIABETES MELLITUS WITH HYPERGLYCEMIA, WITHOUT LONG-TERM CURRENT USE OF INSULIN: ICD-10-CM

## 2024-03-22 LAB
ALBUMIN SERPL-MCNC: 4.1 G/DL (ref 3.4–4.8)
ALBUMIN/GLOB SERPL: 1.2 RATIO (ref 1.1–2)
ALP SERPL-CCNC: 135 UNIT/L (ref 40–150)
ALT SERPL-CCNC: 65 UNIT/L (ref 0–55)
AST SERPL-CCNC: 30 UNIT/L (ref 5–34)
BASOPHILS # BLD AUTO: 0.06 X10(3)/MCL
BASOPHILS NFR BLD AUTO: 0.6 %
BILIRUB SERPL-MCNC: 0.6 MG/DL
BUN SERPL-MCNC: 11.3 MG/DL (ref 9.8–20.1)
CALCIUM SERPL-MCNC: 9.8 MG/DL (ref 8.4–10.2)
CHLORIDE SERPL-SCNC: 109 MMOL/L (ref 98–107)
CHOLEST SERPL-MCNC: 206 MG/DL
CHOLEST/HDLC SERPL: 2 {RATIO} (ref 0–5)
CO2 SERPL-SCNC: 26 MMOL/L (ref 23–31)
CREAT SERPL-MCNC: 1.02 MG/DL (ref 0.55–1.02)
CREAT UR-MCNC: 37 MG/DL (ref 45–106)
EOSINOPHIL # BLD AUTO: 0.15 X10(3)/MCL (ref 0–0.9)
EOSINOPHIL NFR BLD AUTO: 1.6 %
ERYTHROCYTE [DISTWIDTH] IN BLOOD BY AUTOMATED COUNT: 13 % (ref 11.5–17)
EST. AVERAGE GLUCOSE BLD GHB EST-MCNC: 214.5 MG/DL
GFR SERPLBLD CREATININE-BSD FMLA CKD-EPI: 56 MLS/MIN/1.73/M2
GLOBULIN SER-MCNC: 3.4 GM/DL (ref 2.4–3.5)
GLUCOSE SERPL-MCNC: 206 MG/DL (ref 82–115)
HBA1C MFR BLD: 9.1 %
HCT VFR BLD AUTO: 44.2 % (ref 37–47)
HDLC SERPL-MCNC: 100 MG/DL (ref 35–60)
HGB BLD-MCNC: 14.5 G/DL (ref 12–16)
IMM GRANULOCYTES # BLD AUTO: 0.02 X10(3)/MCL (ref 0–0.04)
IMM GRANULOCYTES NFR BLD AUTO: 0.2 %
LDLC SERPL CALC-MCNC: 93 MG/DL (ref 50–140)
LYMPHOCYTES # BLD AUTO: 2.13 X10(3)/MCL (ref 0.6–4.6)
LYMPHOCYTES NFR BLD AUTO: 22.8 %
MCH RBC QN AUTO: 29.5 PG (ref 27–31)
MCHC RBC AUTO-ENTMCNC: 32.8 G/DL (ref 33–36)
MCV RBC AUTO: 90 FL (ref 80–94)
MICROALBUMIN UR-MCNC: 68.1 UG/ML
MICROALBUMIN/CREAT RATIO PNL UR: 184.1 MG/GM CR (ref 0–30)
MONOCYTES # BLD AUTO: 0.57 X10(3)/MCL (ref 0.1–1.3)
MONOCYTES NFR BLD AUTO: 6.1 %
NEUTROPHILS # BLD AUTO: 6.42 X10(3)/MCL (ref 2.1–9.2)
NEUTROPHILS NFR BLD AUTO: 68.7 %
NRBC BLD AUTO-RTO: 0 %
PLATELET # BLD AUTO: 329 X10(3)/MCL (ref 130–400)
PMV BLD AUTO: 8.2 FL (ref 7.4–10.4)
POTASSIUM SERPL-SCNC: 4.2 MMOL/L (ref 3.5–5.1)
PROT SERPL-MCNC: 7.5 GM/DL (ref 5.8–7.6)
RBC # BLD AUTO: 4.91 X10(6)/MCL (ref 4.2–5.4)
SODIUM SERPL-SCNC: 144 MMOL/L (ref 136–145)
TRIGL SERPL-MCNC: 66 MG/DL (ref 37–140)
TSH SERPL-ACNC: 7.29 UIU/ML (ref 0.35–4.94)
VLDLC SERPL CALC-MCNC: 13 MG/DL
WBC # SPEC AUTO: 9.35 X10(3)/MCL (ref 4.5–11.5)

## 2024-03-22 PROCEDURE — 80061 LIPID PANEL: CPT

## 2024-03-22 PROCEDURE — 82043 UR ALBUMIN QUANTITATIVE: CPT

## 2024-03-22 PROCEDURE — 83036 HEMOGLOBIN GLYCOSYLATED A1C: CPT

## 2024-03-22 PROCEDURE — 84443 ASSAY THYROID STIM HORMONE: CPT

## 2024-03-22 PROCEDURE — 80053 COMPREHEN METABOLIC PANEL: CPT

## 2024-03-22 PROCEDURE — 36415 COLL VENOUS BLD VENIPUNCTURE: CPT

## 2024-03-22 PROCEDURE — 85025 COMPLETE CBC W/AUTO DIFF WBC: CPT

## 2024-03-25 DIAGNOSIS — E03.9 HYPOTHYROIDISM, UNSPECIFIED TYPE: ICD-10-CM

## 2024-03-25 DIAGNOSIS — E11.65 TYPE 2 DIABETES MELLITUS WITH HYPERGLYCEMIA, WITHOUT LONG-TERM CURRENT USE OF INSULIN: ICD-10-CM

## 2024-03-25 RX ORDER — DULAGLUTIDE 3 MG/.5ML
3 INJECTION, SOLUTION SUBCUTANEOUS
Qty: 4 PEN | Refills: 11 | Status: SHIPPED | OUTPATIENT
Start: 2024-03-25 | End: 2024-04-10

## 2024-03-25 RX ORDER — LEVOTHYROXINE SODIUM 75 UG/1
75 TABLET ORAL
Qty: 30 TABLET | Refills: 11 | Status: SHIPPED | OUTPATIENT
Start: 2024-03-25

## 2024-03-26 ENCOUNTER — TELEPHONE (OUTPATIENT)
Dept: FAMILY MEDICINE | Facility: CLINIC | Age: 79
End: 2024-03-26
Payer: MEDICARE

## 2024-03-26 NOTE — TELEPHONE ENCOUNTER
----- Message from Alfie Mendiola MD sent at 3/25/2024  1:39 PM CDT -----  Results reviewed with patient over phone  A1c up to 9  Urine M/C very high  Increase trulicity to 3 mg subQ weekly  New Rx sent  Work on low fat diabetes diet  Check sugar twice a day    TSH high, increase levothyroxine to 75 mcg po daily  Repeat thyroid function test at next OV in May 2024    Liver enzymes are high- likely from diabetes uncontrolled    Please set up telephone visit in 2 weeks, double book 8 am M-Thu  Thanks!

## 2024-04-10 ENCOUNTER — OFFICE VISIT (OUTPATIENT)
Dept: FAMILY MEDICINE | Facility: CLINIC | Age: 79
End: 2024-04-10
Payer: MEDICARE

## 2024-04-10 DIAGNOSIS — E11.65 TYPE 2 DIABETES MELLITUS WITH HYPERGLYCEMIA, WITHOUT LONG-TERM CURRENT USE OF INSULIN: Primary | ICD-10-CM

## 2024-04-10 PROCEDURE — 99214 OFFICE O/P EST MOD 30 MIN: CPT | Mod: 95,,, | Performed by: INTERNAL MEDICINE

## 2024-04-10 RX ORDER — DULAGLUTIDE 4.5 MG/.5ML
4.5 INJECTION, SOLUTION SUBCUTANEOUS
Qty: 4 PEN | Refills: 11 | Status: SHIPPED | OUTPATIENT
Start: 2024-04-10 | End: 2025-04-10

## 2024-04-10 NOTE — PROGRESS NOTES
TELEMEDICINE VISIT     Patient ID: Christina Car is a 78 y.o. female.  MRN: [id number]  : 1945    Subjective:        TELEMEDICINE  The patient location is: home  The chief complaint leading to consultation is: Diabetes     Visit type: Virtual visit with synchronous audio and video  Of note, no video connection available today    Total time spent with patient: 15 minutes  20 minutes of total time spent on the encounter, which includes face to face time and non-face to face time preparing to see the patient (eg, review of tests), obtaining and/or reviewing separately obtained history, documenting clinical information in the electronic or other health record, independently interpreting results (not separately reported) and communicating results to the patient/family/caregiver, or care coordination (not separately reported).    Each patient to whom he or she provides medical services by telemedicine is:  (1) informed of the relationship between the physician and patient and the respective role of any other health care provider with respect to management of the patient; and (2) notified that he or she may decline to receive medical services by telemedicine and may withdraw from such care at any time.    HPI: HPI     Type 2 DM follow up  A1c uncontrolled  At last OV advised to increase her trulicity to 3 mg subQ weekly  Continued jardiance and glipizide  She reports she is trying to take her sugar readings      BG readings are as below since medication change:   ,   AM  171,   AM  173,   AM  146,   AM  175,      AM  141,   AM  170  PM skipped  AM: 170          Health maintenance reviewed with the patient.  Health maintenance completed:  Health Maintenance Topics with due status: Not Due       Topic Last Completion Date    DEXA Scan 2022    TETANUS VACCINE 10/03/2022    Diabetes Urine Screening 2024    Lipid Panel 2024    Hemoglobin A1c 2024       Health maintenance due:  Health Maintenance Due   Topic Date Due    Eye Exam  01/23/2024      ROS:  Review of Systems   Constitutional:  Negative for chills, fatigue and fever.   Eyes:  Negative for pain.   Respiratory:  Negative for cough and shortness of breath.    Cardiovascular:  Negative for chest pain, palpitations and leg swelling.   Gastrointestinal:  Negative for abdominal pain, blood in stool and change in bowel habit.   Genitourinary:  Negative for difficulty urinating.   Musculoskeletal:  Negative for joint swelling.   Integumentary:  Negative for rash.   Neurological:  Negative for dizziness and headaches.      History:     Past Medical History:   Diagnosis Date    Carpal tunnel syndrome     Chronic kidney disease, stage 3a 7/19/2022    HLD (hyperlipidemia)     HTN (hypertension)     Hyperlipidemia associated with type 2 diabetes mellitus 7/19/2022    Hypothyroidism, unspecified     Osteopenia     Type 2 diabetes mellitus with hyperglycemia 7/19/2022    Type 2 diabetes mellitus without complications       Past Surgical History:   Procedure Laterality Date    APPENDECTOMY  2014    biopsy of breast  1994    CATARACT EXTRACTION Bilateral 2018    HYSTERECTOMY       No family history on file.   Social History     Tobacco Use    Smoking status: Never     Passive exposure: Never    Smokeless tobacco: Never   Substance and Sexual Activity    Alcohol use: Never    Drug use: Never    Sexual activity: Not on file          Allergies: Review of patient's allergies indicates:  No Known Allergies  Objective:   There were no vitals filed for this visit.      Physical Examination: Physical exam was not performed during this virtual visit.  Physical Exam    Labs:   Diagnostic Tests:  Significant Imaging: I have reviewed and interpreted all pertinent imaging results/findings.  X-Ray Lumbar Spine AP And Lateral  Narrative: EXAMINATION:  XR LUMBAR SPINE AP AND LATERAL    CPT: 46080    CLINICAL HISTORY:  Low back pain,  unspecified    FINDINGS:  There are 5 non rib-bearing vertebral bodies with a rotatory scoliosis) to the left) with maximum curvature of the level of L3 there are some degenerative changes with narrowing of disc spaces marginal osteophytes and degenerative changes of the posterior elements at multiple levels no definite acute fractures or dislocations identified  Impression: Significant rotatory levoscoliosis with degenerative changes    Electronically signed by: Thomas Nolan  Date:    09/28/2022  Time:    12:31      Laboratory Data:  All pertinent labs have been reviewed.  I have reviewed the following records today:     Details:   [x] Labs [] Internal  [] External    [] Micro [] Internal  [] External    [] Pathology [] Internal  [] External    [x] Imaging [] Internal  [] External    [x] Cardiology Procedures [] Internal  [] External    [x] Provider Records [] Internal  [] External    [] Other [] Internal  [] External      Labs:   Lab Results   Component Value Date    WBC 9.35 03/22/2024    RBC 4.91 03/22/2024    HGB 14.5 03/22/2024    HCT 44.2 03/22/2024    MCV 90.0 03/22/2024    MCH 29.5 03/22/2024     03/22/2024     03/22/2024    K 4.2 03/22/2024    BUN 11.3 03/22/2024    CREATININE 1.02 03/22/2024    AST 30 03/22/2024    ALT 65 (H) 03/22/2024    BILITOT 0.6 03/22/2024    TSH 7.293 (H) 03/22/2024    HGBA1C 9.1 (H) 03/22/2024      Medications:     Medication List with Changes/Refills   New Medications    DULAGLUTIDE (TRULICITY) 4.5 MG/0.5 ML PEN INJECTOR    Inject 4.5 mg into the skin every 7 days.   Current Medications    AMLODIPINE (NORVASC) 10 MG TABLET    Take 1 tablet by mouth once daily    ASCORBIC ACID, VITAMIN C, (VITAMIN C) 1000 MG TABLET    Take 1,000 mg by mouth once daily.    ATORVASTATIN (LIPITOR) 40 MG TABLET    Take 1 tablet by mouth once daily    BENAZEPRIL (LOTENSIN) 40 MG TABLET    Take 1 tablet by mouth once daily    CHOLECALCIFEROL, VITAMIN D3, (VITAMIN D3) 50 MCG (2,000  UNIT) TAB    Take 2,000 Units by mouth once daily.    CO-ENZYME Q-10 50 MG CAPSULE    Take 100 mg by mouth once daily.    GLIPIZIDE (GLUCOTROL) 5 MG TABLET    TAKE 1 TABLET BY MOUTH TWICE DAILY BEFORE MEAL(S)    GLUCOSAMINE-CHONDROITIN 500-400 MG TABLET    Take 1 tablet by mouth 3 (three) times daily.    JARDIANCE 25 MG TABLET    TAKE 1 TABLET BY MOUTH IN THE MORNING    LACTOBACILLUS RHAMNOSUS GG (CULTURELLE) 10 BILLION CELL CAPSULE    Take 1 capsule by mouth once daily.    LEVOTHYROXINE (SYNTHROID) 75 MCG TABLET    Take 1 tablet (75 mcg total) by mouth before breakfast.    METOPROLOL SUCCINATE (TOPROL-XL) 25 MG 24 HR TABLET    Take 1 tablet (25 mg total) by mouth once daily.    OMEGA-3 FATTY ACIDS/FISH OIL (FISH OIL-OMEGA-3 FATTY ACIDS) 300-1,000 MG CAPSULE    Take 1 capsule by mouth once daily.    OXYBUTYNIN (DITROPAN) 5 MG TAB    TAKE 1 TABLET BY MOUTH ONCE DAILY IN THE EVENING   Discontinued Medications    DULAGLUTIDE (TRULICITY) 3 MG/0.5 ML PEN INJECTOR    Inject 3 mg into the skin every 7 days.     Assessment:     1. Type 2 diabetes mellitus with hyperglycemia, without long-term current use of insulin      Plan:   Christina was seen today for diabetes.    Diagnoses and all orders for this visit:    Type 2 diabetes mellitus with hyperglycemia, without long-term current use of insulin  -     dulaglutide (TRULICITY) 4.5 mg/0.5 mL pen injector; Inject 4.5 mg into the skin every 7 days.    BG remains uncontrolled  D/c trulicity 3 mg dose  Increase trulicity to 4.5 mg subQ weekly, first dose will be next week on Wednesday as she took her 3 mg dose today  Continue jardiance  Continue glipizide  F/u in 3 weeks phone call to see how she's doing      Follow Up:   Follow up in about 3 weeks (around 5/1/2024) for Virtual Visit.    I spent greater than 15 minutes today both in chart review and greater than 50% of that time in discussion with the patient regarding health maintenance, diagnoses, diagnostic tests,  medications, treatments, symptom management, expected results and adverse effects. Patient verbalized understanding and all questions were answered.

## 2024-05-24 ENCOUNTER — OFFICE VISIT (OUTPATIENT)
Dept: FAMILY MEDICINE | Facility: CLINIC | Age: 79
End: 2024-05-24
Payer: MEDICARE

## 2024-05-24 VITALS
OXYGEN SATURATION: 98 % | SYSTOLIC BLOOD PRESSURE: 134 MMHG | BODY MASS INDEX: 18.01 KG/M2 | DIASTOLIC BLOOD PRESSURE: 70 MMHG | HEIGHT: 62 IN | TEMPERATURE: 99 F | HEART RATE: 78 BPM | WEIGHT: 97.88 LBS | RESPIRATION RATE: 16 BRPM

## 2024-05-24 DIAGNOSIS — E11.65 TYPE 2 DIABETES MELLITUS WITH HYPERGLYCEMIA, WITHOUT LONG-TERM CURRENT USE OF INSULIN: Primary | ICD-10-CM

## 2024-05-24 DIAGNOSIS — E11.69 HYPERLIPIDEMIA ASSOCIATED WITH TYPE 2 DIABETES MELLITUS: ICD-10-CM

## 2024-05-24 DIAGNOSIS — E03.8 OTHER SPECIFIED HYPOTHYROIDISM: ICD-10-CM

## 2024-05-24 DIAGNOSIS — E78.5 HYPERLIPIDEMIA ASSOCIATED WITH TYPE 2 DIABETES MELLITUS: ICD-10-CM

## 2024-05-24 DIAGNOSIS — I15.2 HYPERTENSION ASSOCIATED WITH TYPE 2 DIABETES MELLITUS: ICD-10-CM

## 2024-05-24 DIAGNOSIS — E11.59 HYPERTENSION ASSOCIATED WITH TYPE 2 DIABETES MELLITUS: ICD-10-CM

## 2024-05-24 PROCEDURE — 99214 OFFICE O/P EST MOD 30 MIN: CPT | Mod: ,,, | Performed by: INTERNAL MEDICINE

## 2024-05-24 NOTE — PROGRESS NOTES
"Subjective:      Patient ID: Christina Car is a 79 y.o. female.    Chief Complaint: Diabetes    HPI  Type 2 DM follow up  Doing well today  Reports did lose some weight since trulicity increased from 101 to 97 pounds  Says she is eating quite a bit  Checking BG BID  Says highest is 169 in the AM  Lowest is evening 98    Health Maintenance Due   Topic Date Due    Eye Exam  01/23/2024    COVID-19 Vaccine (8 - 2023-24 season) 02/15/2024     Review of Systems   Constitutional:  Negative for chills and fever.   HENT:  Negative for congestion, sinus pressure and sore throat.    Respiratory:  Negative for cough and shortness of breath.    Cardiovascular:  Negative for chest pain and palpitations.   Gastrointestinal:  Negative for abdominal pain and nausea.   Skin:  Negative for rash.       Objective:     Vitals:    05/24/24 0857 05/24/24 0900   BP: (!) 164/66 134/70  Comment: at home BP   BP Location: Left arm Left arm   Patient Position: Sitting Sitting   BP Method: Medium (Automatic) Medium (Automatic)   Pulse: 78    Resp: 16    Temp: 98.7 °F (37.1 °C)    TempSrc: Temporal    SpO2: 98%    Weight: 44.4 kg (97 lb 14.4 oz)    Height: 5' 2" (1.575 m)      Physical Exam  Vitals and nursing note reviewed.   Constitutional:       General: She is not in acute distress.     Appearance: Normal appearance. She is not ill-appearing.   HENT:      Head: Normocephalic and atraumatic.   Neurological:      Mental Status: She is alert.       Assessment/Plan:     1. Type 2 diabetes mellitus with hyperglycemia, without long-term current use of insulin  -     Hemoglobin A1C; Future; Expected date: 07/05/2024  -     Comprehensive Metabolic Panel; Future; Expected date: 07/05/2024  -     TSH; Future; Expected date: 07/05/2024  Stable continue current Rx  D/c glipizide if FBG is less than 80 and notify me  Check labs and weight in next 2 months  If she loses more weight, will need to reduce/DC trulicity and switch to different treatment " course  Continue low fat ada diet  2. Hypertension associated with type 2 diabetes mellitus  -     Comprehensive Metabolic Panel; Future; Expected date: 07/05/2024  -     TSH; Future; Expected date: 07/05/2024  Bp controlled, ccm  3. Hyperlipidemia associated with type 2 diabetes mellitus  -     Comprehensive Metabolic Panel; Future; Expected date: 07/05/2024  -     TSH; Future; Expected date: 07/05/2024  Stable continue statin therapy no myalgias  4. Other specified hypothyroidism  -     TSH; Future; Expected date: 07/05/2024  Patient with some weight loss  Repeat TSH     Follow up in about 8 weeks (around 7/19/2024) for Follow Up - Chronic Conditions.    I spent a total of 20 minutes on the day of the visit.This includes face to face time and non-face to face time preparing to see the patient (eg, review of tests), obtaining and/or reviewing separately obtained history, documenting clinical information in the electronic or other health record, independently interpreting results and communicating results to the patient/family/caregiver, or care coordinator.

## 2024-06-17 DIAGNOSIS — I15.2 HYPERTENSION ASSOCIATED WITH TYPE 2 DIABETES MELLITUS: ICD-10-CM

## 2024-06-17 DIAGNOSIS — E11.59 HYPERTENSION ASSOCIATED WITH TYPE 2 DIABETES MELLITUS: ICD-10-CM

## 2024-06-17 DIAGNOSIS — E11.65 TYPE 2 DIABETES MELLITUS WITH HYPERGLYCEMIA, WITHOUT LONG-TERM CURRENT USE OF INSULIN: ICD-10-CM

## 2024-06-17 RX ORDER — EMPAGLIFLOZIN 25 MG/1
TABLET, FILM COATED ORAL
Qty: 90 TABLET | Refills: 2 | Status: SHIPPED | OUTPATIENT
Start: 2024-06-17

## 2024-07-01 ENCOUNTER — TELEPHONE (OUTPATIENT)
Dept: FAMILY MEDICINE | Facility: CLINIC | Age: 79
End: 2024-07-01
Payer: MEDICARE

## 2024-07-01 NOTE — TELEPHONE ENCOUNTER
----- Message from Tomas Guaman sent at 7/1/2024 11:30 AM CDT -----  Type:  Needs Medical Advice    Who Called: pt  Symptoms (please be specific):    How long has patient had these symptoms:    Pharmacy name and phone #:    Would the patient rather a call back or a response via [facility]?  Best Call Back Number:  [phone]  Additional Information: pt req   mammogram order is sent to  to [facility] , pt has dense breast tissue , stated Breast US will also need to be faxed , pt currently sched on 09/18 at 11:00.

## 2024-07-22 DIAGNOSIS — I15.2 HYPERTENSION ASSOCIATED WITH TYPE 2 DIABETES MELLITUS: ICD-10-CM

## 2024-07-22 DIAGNOSIS — E11.59 HYPERTENSION ASSOCIATED WITH TYPE 2 DIABETES MELLITUS: ICD-10-CM

## 2024-07-22 RX ORDER — AMLODIPINE BESYLATE 10 MG/1
TABLET ORAL
Qty: 90 TABLET | Refills: 3 | Status: SHIPPED | OUTPATIENT
Start: 2024-07-22

## 2024-07-23 ENCOUNTER — LAB VISIT (OUTPATIENT)
Dept: LAB | Facility: HOSPITAL | Age: 79
End: 2024-07-23
Attending: INTERNAL MEDICINE
Payer: MEDICARE

## 2024-07-23 DIAGNOSIS — I15.2 HYPERTENSION ASSOCIATED WITH TYPE 2 DIABETES MELLITUS: ICD-10-CM

## 2024-07-23 DIAGNOSIS — E03.8 OTHER SPECIFIED HYPOTHYROIDISM: ICD-10-CM

## 2024-07-23 DIAGNOSIS — E78.5 HYPERLIPIDEMIA ASSOCIATED WITH TYPE 2 DIABETES MELLITUS: ICD-10-CM

## 2024-07-23 DIAGNOSIS — E11.69 HYPERLIPIDEMIA ASSOCIATED WITH TYPE 2 DIABETES MELLITUS: ICD-10-CM

## 2024-07-23 DIAGNOSIS — E11.65 TYPE 2 DIABETES MELLITUS WITH HYPERGLYCEMIA, WITHOUT LONG-TERM CURRENT USE OF INSULIN: ICD-10-CM

## 2024-07-23 DIAGNOSIS — E11.59 HYPERTENSION ASSOCIATED WITH TYPE 2 DIABETES MELLITUS: ICD-10-CM

## 2024-07-23 LAB
ALBUMIN SERPL-MCNC: 3.9 G/DL (ref 3.4–4.8)
ALBUMIN/GLOB SERPL: 1.1 RATIO (ref 1.1–2)
ALP SERPL-CCNC: 107 UNIT/L (ref 40–150)
ALT SERPL-CCNC: 37 UNIT/L (ref 0–55)
ANION GAP SERPL CALC-SCNC: 10 MEQ/L
AST SERPL-CCNC: 26 UNIT/L (ref 5–34)
BILIRUB SERPL-MCNC: 0.9 MG/DL
BUN SERPL-MCNC: 18.1 MG/DL (ref 9.8–20.1)
CALCIUM SERPL-MCNC: 10.7 MG/DL (ref 8.4–10.2)
CHLORIDE SERPL-SCNC: 105 MMOL/L (ref 98–107)
CO2 SERPL-SCNC: 25 MMOL/L (ref 23–31)
CREAT SERPL-MCNC: 0.9 MG/DL (ref 0.55–1.02)
CREAT/UREA NIT SERPL: 20
EST. AVERAGE GLUCOSE BLD GHB EST-MCNC: 174.3 MG/DL
GFR SERPLBLD CREATININE-BSD FMLA CKD-EPI: >60 ML/MIN/1.73/M2
GLOBULIN SER-MCNC: 3.4 GM/DL (ref 2.4–3.5)
GLUCOSE SERPL-MCNC: 260 MG/DL (ref 82–115)
HBA1C MFR BLD: 7.7 %
POTASSIUM SERPL-SCNC: 4 MMOL/L (ref 3.5–5.1)
PROT SERPL-MCNC: 7.3 GM/DL (ref 5.8–7.6)
SODIUM SERPL-SCNC: 140 MMOL/L (ref 136–145)
TSH SERPL-ACNC: 0.35 UIU/ML (ref 0.35–4.94)

## 2024-07-23 PROCEDURE — 83036 HEMOGLOBIN GLYCOSYLATED A1C: CPT

## 2024-07-23 PROCEDURE — 80053 COMPREHEN METABOLIC PANEL: CPT

## 2024-07-23 PROCEDURE — 36415 COLL VENOUS BLD VENIPUNCTURE: CPT

## 2024-07-23 PROCEDURE — 84443 ASSAY THYROID STIM HORMONE: CPT

## 2024-07-29 ENCOUNTER — OFFICE VISIT (OUTPATIENT)
Dept: FAMILY MEDICINE | Facility: CLINIC | Age: 79
End: 2024-07-29
Payer: MEDICARE

## 2024-07-29 VITALS
BODY MASS INDEX: 18.15 KG/M2 | WEIGHT: 98.63 LBS | RESPIRATION RATE: 18 BRPM | HEART RATE: 64 BPM | OXYGEN SATURATION: 99 % | SYSTOLIC BLOOD PRESSURE: 115 MMHG | TEMPERATURE: 98 F | HEIGHT: 62 IN | DIASTOLIC BLOOD PRESSURE: 68 MMHG

## 2024-07-29 DIAGNOSIS — E11.59 HYPERTENSION ASSOCIATED WITH TYPE 2 DIABETES MELLITUS: ICD-10-CM

## 2024-07-29 DIAGNOSIS — N18.31 CHRONIC KIDNEY DISEASE, STAGE 3A: ICD-10-CM

## 2024-07-29 DIAGNOSIS — I15.2 HYPERTENSION ASSOCIATED WITH TYPE 2 DIABETES MELLITUS: ICD-10-CM

## 2024-07-29 DIAGNOSIS — E83.52 HYPERCALCEMIA: Primary | ICD-10-CM

## 2024-07-29 DIAGNOSIS — E11.65 TYPE 2 DIABETES MELLITUS WITH HYPERGLYCEMIA, WITHOUT LONG-TERM CURRENT USE OF INSULIN: ICD-10-CM

## 2024-07-29 PROBLEM — R74.01 TRANSAMINITIS: Status: RESOLVED | Noted: 2023-09-19 | Resolved: 2024-07-29

## 2024-07-29 PROCEDURE — 99214 OFFICE O/P EST MOD 30 MIN: CPT | Mod: ,,, | Performed by: INTERNAL MEDICINE

## 2024-07-29 NOTE — PROGRESS NOTES
"Subjective:      Patient ID: Christina Car is a 79 y.o. female.    Chief Complaint: Diabetes    HPI    Patient is here for DM follow up  Does not check sugar at home  Compliant with med  She is working hard on cutting down on sweets,carbs  A1c has improved  Zam[facility]am Next month, eye exam scheduled  Breast septebmer  scheduled   Health Maintenance Due   Topic Date Due    Eye Exam  01/23/2024    COVID-19 Vaccine (8 - 2023-24 season) 02/15/2024     Review of Systems   Constitutional:  Negative for chills and fever.   HENT:  Negative for congestion, sinus pressure and sore throat.    Respiratory:  Negative for cough and shortness of breath.    Cardiovascular:  Negative for chest pain and palpitations.   Gastrointestinal:  Negative for abdominal pain and nausea.   Skin:  Negative for rash.       Objective:     Vitals:    07/29/24 1024 07/29/24 1025   BP: (!) 140/80 115/68  Comment: at home reading   BP Location: Left arm Left arm   Patient Position: Sitting Sitting   BP Method: Medium (Automatic) Medium (Automatic)   Pulse: 72 64   Resp: 18    Temp: 98.3 °F (36.8 °C)    TempSrc: Temporal    SpO2: 99%    Weight: 44.7 kg (98 lb 9.6 oz)    Height: 5' 2" (1.575 m)      Physical Exam  Vitals and nursing note reviewed.   Constitutional:       Appearance: Normal appearance.   Neurological:      Mental Status: She is alert.       Assessment/Plan:     1. Hypercalcemia  -     Calcium, Ionized; Future; Expected date: 07/29/2024  -     PTH, Intact; Future; Expected date: 07/29/2024  Stop calcium and vitamin D supplement  Repeat calcium, PTH in 1 month ordered  2. Type 2 diabetes mellitus with hyperglycemia, without long-term current use of insulin  -     Hemoglobin A1C; Future; Expected date: 10/29/2024  Improved, but still elevated  Check A1c again in 3 mos  If we have still struggling with holding her weight or with sugar high, we will discuss insulin therapy for her to take instead of current Rx  3. Chronic kidney disease, " stage 3a  Stable last renal function improved  4. Hypertension associated with type 2 diabetes mellitus  Stable home BP log reviewed, home BP ad hoc, she does have some white coat HTN     Follow up in about 3 months (around 10/29/2024) for Follow Up - Chronic Conditions.    I spent a total of 20 minutes on the day of the visit.This includes face to face time and non-face to face time preparing to see the patient (eg, review of tests), obtaining and/or reviewing separately obtained history, documenting clinical information in the electronic or other health record, independently interpreting results and communicating results to the patient/family/caregiver, or care coordinator.

## 2024-08-26 ENCOUNTER — LAB VISIT (OUTPATIENT)
Dept: LAB | Facility: HOSPITAL | Age: 79
End: 2024-08-26
Attending: INTERNAL MEDICINE
Payer: MEDICARE

## 2024-08-26 DIAGNOSIS — E83.52 HYPERCALCEMIA: ICD-10-CM

## 2024-08-26 LAB — PTH-INTACT SERPL-MCNC: 117.5 PG/ML (ref 8.7–77)

## 2024-08-26 PROCEDURE — 36415 COLL VENOUS BLD VENIPUNCTURE: CPT

## 2024-08-26 PROCEDURE — 82330 ASSAY OF CALCIUM: CPT

## 2024-08-26 PROCEDURE — 83970 ASSAY OF PARATHORMONE: CPT

## 2024-08-27 ENCOUNTER — TELEPHONE (OUTPATIENT)
Dept: FAMILY MEDICINE | Facility: CLINIC | Age: 79
End: 2024-08-27
Payer: MEDICARE

## 2024-08-27 LAB — CA-I ADJ PH7.4 SERPL ISE-MCNC: 5.13 MG/DL (ref 4.57–5.43)

## 2024-08-27 NOTE — TELEPHONE ENCOUNTER
Please call patient  At her last OV we noticed her calcium levels were high  She recently completed labs for me to evaluate possible causes of this problem  Her parathyroid hormone was elevated which could be a cause of this  The parathyroid glands sit in the neck behind the thyroid gland, they regulate the calcium in the body  I need her to see an ENT physician to evaluate how we should approach treatment for this problem  I will place referral to ENT if agreeable  Please let me know

## 2024-08-27 NOTE — TELEPHONE ENCOUNTER
Spoke with pt  Advised that her parathyroid levels were elevated which could be the cause of her elevated calcium levels  Dr Erickson recommendation is referral to ENT  Pt amendable with referral  Thanks

## 2024-09-01 DIAGNOSIS — E11.59 HYPERTENSION ASSOCIATED WITH TYPE 2 DIABETES MELLITUS: ICD-10-CM

## 2024-09-01 DIAGNOSIS — I15.2 HYPERTENSION ASSOCIATED WITH TYPE 2 DIABETES MELLITUS: ICD-10-CM

## 2024-09-03 RX ORDER — METOPROLOL SUCCINATE 25 MG/1
25 TABLET, EXTENDED RELEASE ORAL
Qty: 90 TABLET | Refills: 1 | Status: SHIPPED | OUTPATIENT
Start: 2024-09-03

## 2024-09-11 DIAGNOSIS — I15.2 HYPERTENSION ASSOCIATED WITH TYPE 2 DIABETES MELLITUS: ICD-10-CM

## 2024-09-11 DIAGNOSIS — E11.59 HYPERTENSION ASSOCIATED WITH TYPE 2 DIABETES MELLITUS: ICD-10-CM

## 2024-09-11 DIAGNOSIS — E11.65 TYPE 2 DIABETES MELLITUS WITH HYPERGLYCEMIA, WITHOUT LONG-TERM CURRENT USE OF INSULIN: ICD-10-CM

## 2024-09-12 RX ORDER — BENAZEPRIL HYDROCHLORIDE 40 MG/1
TABLET ORAL
Qty: 90 TABLET | Refills: 1 | Status: SHIPPED | OUTPATIENT
Start: 2024-09-12

## 2024-09-16 LAB — BCS RECOMMENDATION EXT: NORMAL

## 2024-09-18 ENCOUNTER — DOCUMENTATION ONLY (OUTPATIENT)
Dept: FAMILY MEDICINE | Facility: CLINIC | Age: 79
End: 2024-09-18
Payer: MEDICARE

## 2024-09-19 ENCOUNTER — TELEPHONE (OUTPATIENT)
Dept: FAMILY MEDICINE | Facility: CLINIC | Age: 79
End: 2024-09-19
Payer: MEDICARE

## 2024-09-19 NOTE — TELEPHONE ENCOUNTER
----- Message from Alfie Mendiola MD sent at 9/19/2024  2:02 PM CDT -----  Please let patient know their mammogram did not reveal any evidence of breast cancer. Thanks

## 2024-10-02 DIAGNOSIS — E11.69 HYPERLIPIDEMIA ASSOCIATED WITH TYPE 2 DIABETES MELLITUS: ICD-10-CM

## 2024-10-02 DIAGNOSIS — E78.5 HYPERLIPIDEMIA ASSOCIATED WITH TYPE 2 DIABETES MELLITUS: ICD-10-CM

## 2024-10-02 RX ORDER — ATORVASTATIN CALCIUM 40 MG/1
TABLET, FILM COATED ORAL
Qty: 90 TABLET | Refills: 0 | Status: SHIPPED | OUTPATIENT
Start: 2024-10-02

## 2024-10-19 DIAGNOSIS — E11.69 HYPERLIPIDEMIA ASSOCIATED WITH TYPE 2 DIABETES MELLITUS: ICD-10-CM

## 2024-10-19 DIAGNOSIS — E78.5 HYPERLIPIDEMIA ASSOCIATED WITH TYPE 2 DIABETES MELLITUS: ICD-10-CM

## 2024-10-21 RX ORDER — ATORVASTATIN CALCIUM 40 MG/1
TABLET, FILM COATED ORAL
Qty: 90 TABLET | Refills: 3 | Status: SHIPPED | OUTPATIENT
Start: 2024-10-21

## 2024-10-24 ENCOUNTER — LAB VISIT (OUTPATIENT)
Dept: LAB | Facility: HOSPITAL | Age: 79
End: 2024-10-24
Attending: INTERNAL MEDICINE
Payer: MEDICARE

## 2024-10-24 DIAGNOSIS — E11.65 TYPE 2 DIABETES MELLITUS WITH HYPERGLYCEMIA, WITHOUT LONG-TERM CURRENT USE OF INSULIN: ICD-10-CM

## 2024-10-24 LAB
EST. AVERAGE GLUCOSE BLD GHB EST-MCNC: 182.9 MG/DL
HBA1C MFR BLD: 8 %

## 2024-10-24 PROCEDURE — 83036 HEMOGLOBIN GLYCOSYLATED A1C: CPT

## 2024-10-24 PROCEDURE — 36415 COLL VENOUS BLD VENIPUNCTURE: CPT

## 2024-10-28 ENCOUNTER — OFFICE VISIT (OUTPATIENT)
Dept: FAMILY MEDICINE | Facility: CLINIC | Age: 79
End: 2024-10-28
Payer: MEDICARE

## 2024-10-28 VITALS
HEART RATE: 78 BPM | SYSTOLIC BLOOD PRESSURE: 128 MMHG | OXYGEN SATURATION: 99 % | HEIGHT: 62 IN | BODY MASS INDEX: 18.15 KG/M2 | WEIGHT: 98.63 LBS | RESPIRATION RATE: 16 BRPM | DIASTOLIC BLOOD PRESSURE: 76 MMHG | TEMPERATURE: 98 F

## 2024-10-28 DIAGNOSIS — E83.52 HYPERCALCEMIA: ICD-10-CM

## 2024-10-28 DIAGNOSIS — Z00.00 WELLNESS EXAMINATION: Primary | ICD-10-CM

## 2024-10-28 DIAGNOSIS — E11.69 HYPERLIPIDEMIA ASSOCIATED WITH TYPE 2 DIABETES MELLITUS: ICD-10-CM

## 2024-10-28 DIAGNOSIS — E11.59 HYPERTENSION ASSOCIATED WITH TYPE 2 DIABETES MELLITUS: ICD-10-CM

## 2024-10-28 DIAGNOSIS — E11.65 TYPE 2 DIABETES MELLITUS WITH HYPERGLYCEMIA, WITHOUT LONG-TERM CURRENT USE OF INSULIN: ICD-10-CM

## 2024-10-28 DIAGNOSIS — M85.80 OSTEOPENIA, UNSPECIFIED LOCATION: ICD-10-CM

## 2024-10-28 DIAGNOSIS — N18.31 CHRONIC KIDNEY DISEASE, STAGE 3A: ICD-10-CM

## 2024-10-28 DIAGNOSIS — I15.2 HYPERTENSION ASSOCIATED WITH TYPE 2 DIABETES MELLITUS: ICD-10-CM

## 2024-10-28 DIAGNOSIS — E78.5 HYPERLIPIDEMIA ASSOCIATED WITH TYPE 2 DIABETES MELLITUS: ICD-10-CM

## 2024-10-28 DIAGNOSIS — E21.3 HYPERPARATHYROIDISM: ICD-10-CM

## 2024-10-28 PROCEDURE — G0439 PPPS, SUBSEQ VISIT: HCPCS | Mod: ,,, | Performed by: INTERNAL MEDICINE

## 2024-10-28 RX ORDER — INSULIN GLARGINE 100 [IU]/ML
5 INJECTION, SOLUTION SUBCUTANEOUS NIGHTLY
Qty: 1.5 ML | Refills: 11 | Status: SHIPPED | OUTPATIENT
Start: 2024-10-28 | End: 2025-10-28

## 2024-11-04 ENCOUNTER — CLINICAL SUPPORT (OUTPATIENT)
Dept: DIABETES | Facility: CLINIC | Age: 79
End: 2024-11-04
Payer: MEDICARE

## 2024-11-04 VITALS — HEIGHT: 62 IN | BODY MASS INDEX: 18.8 KG/M2 | WEIGHT: 102.13 LBS

## 2024-11-04 DIAGNOSIS — E11.65 TYPE 2 DIABETES MELLITUS WITH HYPERGLYCEMIA, WITH LONG-TERM CURRENT USE OF INSULIN: ICD-10-CM

## 2024-11-04 DIAGNOSIS — E11.65 TYPE 2 DIABETES MELLITUS WITH HYPERGLYCEMIA, WITH LONG-TERM CURRENT USE OF INSULIN: Primary | ICD-10-CM

## 2024-11-04 DIAGNOSIS — Z79.4 TYPE 2 DIABETES MELLITUS WITH HYPERGLYCEMIA, WITH LONG-TERM CURRENT USE OF INSULIN: Primary | ICD-10-CM

## 2024-11-04 DIAGNOSIS — Z79.4 TYPE 2 DIABETES MELLITUS WITH HYPERGLYCEMIA, WITH LONG-TERM CURRENT USE OF INSULIN: ICD-10-CM

## 2024-11-04 PROCEDURE — G0108 DIAB MANAGE TRN  PER INDIV: HCPCS | Mod: ,,, | Performed by: FAMILY MEDICINE

## 2024-11-04 RX ORDER — PEN NEEDLE, DIABETIC 30 GX3/16"
1 NEEDLE, DISPOSABLE MISCELLANEOUS DAILY
Qty: 50 EACH | Refills: 11 | Status: SHIPPED | OUTPATIENT
Start: 2024-11-04

## 2024-11-04 NOTE — PROGRESS NOTES
"Diabetes Care Specialist Progress Note  Author: Renee Patterson RN  Date: 11/4/2024    Intake    Program Intake  Reason for Diabetes Program Visit:: Initial Diabetes Assessment  Current diabetes risk level:: moderate  In the last month, have you used the ER or been admitted to the hospital: No  Permission to speak with others about care:: yes    Current Diabetes Treatment: Oral Medications, Insulin, DM Injectables  Oral Medication Type/Dose: Jardiance 25mg daily  DM Injectables Type/Dose: Trulicity 4.5mg every thursday  Method of insulin delivery?: Injections  Injection Type: Pens  Pen Type/Dose: Lantus 5 units Every hs    Continuous Glucose Monitoring  Patient has CGM: No    Lab Results   Component Value Date    HGBA1C 8.0 (H) 10/24/2024       Weight: 46.3 kg (102 lb 1.6 oz)   Height: 5' 2" (157.5 cm)   Body mass index is 18.67 kg/m².    Lifestyle Coping Support & Clinical    Lifestyle/Coping/Support  Compared to other people your age, how would you rate your health?: Fair  Does anyone in your family have diabetes or does anyone in your family support you in your diabetes care?: son good support  List anything about Diabetes that causes you stress?: taking meds, testing glucose and not being able to eat sweets  How do you deal with stress/distress?: support from loved ones  Learning Barriers:: None  Culture or Hindu beliefs that may impact ability to access healthcare: No  Psychosocial/Coping Skills Assessment Completed: : Yes  Assessment indicates:: Adequate understanding  Area of need?: No    Problem Review  Active Comorbidities: Chronic Kidney Disease, Hypertension, Hyperlipidemia/Dyslipidemia    Diabetes Self-Management Skills Assessment    Medication Skills Assessment  Patient is able to identify current diabetes medications, dosages, and appropriate timing of medications.: yes  Patient reports problems or concerns with current medication regimen.: yes  Medication regimen problems/concerns:: lack of " training  Patient is  aware that some diabetes medications can cause low blood sugar?: Yes  Medication Skills Assessment Completed:: Yes  Assessment indicates:: Knowledge deficit, Instruction Needed  Area of need?: Yes    Diabetes Disease Process/Treatment Options  Diabetes Type?: Type II  When were you diagnosed?: about age 55  If previous diabetes education, when/where:: no  What are your goals for this education session?: how to take insulin  Is patient aware of what causes diabetes?: Yes  Does patient understand the pathophysiology of diabetes?: No  Diabetes Disease Process/Treatment Options: Skills Assessment Completed: Yes  Assessment indicates:: Adequate understanding  Area of need?: No    Nutrition/Healthy Eating  Who shops/cooks?: she does  Patient can identify foods that impact blood sugar.: yes  Challenges to healthy eating:: snacking between meals and at night  Nutrition/Healthy Eating Skills Assessment Completed:: Yes  Assessment indicates:: Knowledge deficit, Instruction Needed  Area of need?: Yes    Physical Activity/Exercise  Patient's daily activity level:: moderately active  Patient formally exercises outside of work.: yes  Frequency: four or more times a week  Patient can identify forms of physical activity.: yes  Physical Activity/Exercise Skills Assessment Completed: : Yes  Assessment indicates:: Adequate understanding  Area of need?: No    Home Blood Glucose Monitoring  Patient states that blood sugar is checked at home daily.: no  Home Blood Glucose Monitoring Skills Assessment Completed: : Yes  Assessment indicates:: Knowledge deficit, Instruction Needed  Area of need?: Yes    Acute Complications  Have you ever had hypoglycemia (low BG 70 or less)?: no  Do you know the symptoms of low blood sugar and how to treat?: juice  Have you ever had hyperglycemia (high  or more)?: no  Have you ever had DKA?: no  Do you ever test for ketones?: no  Do you have a sick day plan?: no  Acute  Complications Skills Assessment Completed: : Yes  Assessment indicates:: Knowledge deficit, Instruction Needed  Area of need?: Yes    Chronic Complications  Chronic Complications Skills Assessment Completed: : No  Deferred due to:: Time  Area of need?: Deferred      Assessment Summary and Plan    Based on today's diabetes care assessment, the following areas of need were identified:          11/4/2024   Areas of Need   Medications/Current Diabetes Treatment Yes see care plan.   Lifestyle Coping Support No lives alone, son good support    Diabetes Disease Process/Treatment Options No    Nutrition/Healthy Eating Yes reviewed plate method    Physical Activity/Exercise No   Home Blood Glucose Monitoring Yes see care plan    Acute Complications YesDiscussed prevention, identification signs/symptoms and treatment of hyperglycemia and hypoglycemia and when to contact clinic. Reviewed sick day, travel guidelines.    Chronic Complications Deferred            Today's interventions were provided through individual discussion, instruction, and written materials were provided.      Patient verbalized understanding of instruction and written materials.  Pt was able to return back demonstration of instructions today. Patient understood key points, needs reinforcement and further instruction.     Diabetes Self-Management Care Plan:    Today's Diabetes Self-Management Care Plan was developed with Christina's input. Christina has agreed to work toward the following goal(s) to improve his/her overall diabetes control.      Care Plan: Diabetes Management   Updates made since 11/5/2023 12:00 AM        Problem: Medications         Long-Range Goal: Patient Agrees to take Lantus 5 units every night.    Start Date: 11/4/2024   Expected End Date: 12/2/2024   Priority: High   Barriers: Knowledge deficit   Note:    10/4/2024 New rx for Lantus 5 units qhs, She was able to give shot in office today properly and feel better about taking insulin  now.         Task: Reviewed with patient all current diabetes medications and provided basic review of the purpose, dosage, frequency, side effects, and storage of both oral and injectable diabetes medications. Completed 11/4/2024        Task: Instructed patient on how to self-administer Lantus Completed 11/4/2024        Task: Discussed guidelines for preventing, detecting and treating hypoglycemia and hyperglycemia and reviewed the importance of meal and medication timing with diabetes mediations for prevention of hypoglycemia and maximum drug benefit. Completed 11/4/2024        Problem: Blood Glucose Self-Monitoring         Long-Range Goal: Patient agrees to check and record blood sugars daily. FBS    Start Date: 11/4/2024   Expected End Date: 12/2/2024   Priority: Medium   Barriers: Knowledge deficit   Note:    10/4/2024 states doesn't like to test but willing to and log so we can see if medication adjustments needed.        Task: Reviewed the importance of self-monitoring blood glucose and keeping logs. Completed 11/4/2024        Task: Provided patient with blood glucose logs, reviewed appropriate timing and frequency to SMBG, education on parameters on when to notify provider and advised patient to bring logs to all appts with PCP/Endocrinologist/Diabetes Care Specialist. Completed 11/4/2024        Task: Discussed ways to minimize pain when monitoring blood glucose. Completed 11/4/2024          Follow Up Plan     Follow up in about 4 weeks (around 12/2/2024) for review glucose log, nutrition, medication . Appt made with pt. Today for 12/2/2024 at 11:00.  Review A1c ranges and goal, chronic complications.     Today's care plan and follow up schedule was discussed with patient.  Christina verbalized understanding of the care plan, goals, and agrees to follow up plan.        The patient was encouraged to communicate with his/her health care provider/physician and care team regarding his/her condition(s) and  treatment.  I provided the patient with my contact information today and encouraged to contact me via phone or [facility] Patient Portal as needed.     Length of Visit   Total Time: 60 Minutes

## 2024-12-02 ENCOUNTER — CLINICAL SUPPORT (OUTPATIENT)
Dept: DIABETES | Facility: CLINIC | Age: 79
End: 2024-12-02
Payer: MEDICARE

## 2024-12-02 ENCOUNTER — TELEPHONE (OUTPATIENT)
Dept: DIABETES | Facility: CLINIC | Age: 79
End: 2024-12-02

## 2024-12-02 VITALS — BODY MASS INDEX: 18.84 KG/M2 | HEIGHT: 62 IN | WEIGHT: 102.38 LBS

## 2024-12-02 DIAGNOSIS — Z79.4 TYPE 2 DIABETES MELLITUS WITH HYPERGLYCEMIA, WITH LONG-TERM CURRENT USE OF INSULIN: Primary | ICD-10-CM

## 2024-12-02 DIAGNOSIS — E11.65 TYPE 2 DIABETES MELLITUS WITH HYPERGLYCEMIA, WITH LONG-TERM CURRENT USE OF INSULIN: Primary | ICD-10-CM

## 2024-12-02 PROCEDURE — G0108 DIAB MANAGE TRN  PER INDIV: HCPCS | Mod: ,,, | Performed by: INTERNAL MEDICINE

## 2024-12-02 NOTE — TELEPHONE ENCOUNTER
She is in office today for f/u.  Was only taking 2 units of Lantus due to misunderstanding.  She will start taking the 5 units tonight. States she is taking the jardiance 25mg Qhs and Trulicity 4.5mg weekly  She is also still taking glipizide 5mg bid until her FBS is 80.  Your last visit notes state d/c glipizide and that she could not afford jardiacnce.  So just wanted to clarify her medication since she is confused.  See glucose log below.

## 2024-12-02 NOTE — PROGRESS NOTES
"Diabetes Care Specialist Follow-up Note  Author: Renee Patterson RN  Date: 12/2/2024    Intake    Program Intake  Reason for Diabetes Program Visit:: Intervention  Type of Intervention:: Individual  Individual: Education  Education: Nutrition and Meal Planning  Current diabetes risk level:: moderate  In the last month, have you used the ER or been admitted to the hospital: No  Permission to speak with others about care:: yes    Current Diabetes Treatment: Oral Medications, DM Injectables, Insulin  Oral Medication Type/Dose: Jardiance 25mg daily  DM Injectables Type/Dose: Trulicity 4.5mg every thursday  Method of insulin delivery?: Injections  Injection Type: Pens  Pen Type/Dose: Lantus 5 units Every hs    Continuous Glucose Monitoring  Patient has CGM: No    Lab Results   Component Value Date    HGBA1C 8.0 (H) 10/24/2024     Weight: 46.4 kg (102 lb 6.4 oz)   Height: 5' 2" (157.5 cm)   Body mass index is 18.73 kg/m².  No Wt change since last visit on 11/14/24    Diabetes Self-Management Skills Assessment    Nutrition/Healthy Eating  Meal Plan 24 Hour Recall - Breakfast: cherry turnover  Meal Plan 24 Hour Recall - Lunch: muffalatta and chips  Meal Plan 24 Hour Recall - Dinner: noodles and pork pot stickers with veggies  Meal Plan 24 Hour Recall - Snack: fruit  Meal Plan 24 Hour Recall - Beverage: coffee, water    Chronic Complications  Chronic Complications Skills Assessment Completed: : No  Deferred due to:: Time  Area of need?: Deferred    During today's follow-up visit,  the following areas required further assessment and content was provided/reviewed.    Based on today's diabetes care assessment, the following areas of need were identified:      Identified Areas of Need      Medication/Current Diabetes Treatment:  Yes see care plan    Lifestyle Coping/Support:  No    Diabetes Disease Process/Treatment Options:  No   Nutrition/Healthy Eating:   yes see care plan    Physical Activity/Exercise:  no   Home Blood " Glucose Monitoring:   yes see care plan    Acute Complications:   Yes discussed last visit    Chronic Complications: Deferred       Today's interventions were provided through individual discussion, instruction, and written materials were provided.    Patient verbalized understanding of instruction and written materials.  Pt was able to return back demonstration of instructions today. Patient understood key points, needs reinforcement and further instruction.     Diabetes Self-Management Care Plan Review and Evaluation of Progress:    During today's follow-up Christina's Diabetes Self-Management Care Plan progress was reviewed and progress was evaluated including his/her input. Christina has agreed to continue his/her journey to improve/maintain overall diabetes control by continuing to set health goals. See care plan progress below.      Care Plan: Diabetes Management   Updates made since 12/3/2023 12:00 AM        Problem: Medications         Long-Range Goal: Patient Agrees to take Lantus 5 units every night.    Start Date: 11/4/2024   Expected End Date: 12/2/2024   This Visit's Progress: On track   Priority: High   Barriers: Knowledge deficit   Note:    10/4/2024 New rx for Lantus 5 units qhs, She was able to give shot in office today properly and feel better about taking insulin now.    12/2/2024 States was taking 2 units due to misunderstanding.  She will now take 5units qhs and use 2 units only to prime a new pen. She was also still taking glipizide and jardiance.  Pcp last notes states she could not afford jardiance and d/c glipizide, note sent to pcp for medication clarification        Task: Reviewed with patient all current diabetes medications and provided basic review of the purpose, dosage, frequency, side effects, and storage of both oral and injectable diabetes medications. Completed 11/4/2024        Task: Instructed patient on how to self-administer Lantus Completed 11/4/2024        Task: Discussed  guidelines for preventing, detecting and treating hypoglycemia and hyperglycemia and reviewed the importance of meal and medication timing with diabetes mediations for prevention of hypoglycemia and maximum drug benefit. Completed 11/4/2024        Problem: Blood Glucose Self-Monitoring         Long-Range Goal: Patient agrees to check and record blood sugars daily. FBS    Start Date: 11/4/2024   Expected End Date: 12/2/2024   This Visit's Progress: Met   Priority: Medium   Barriers: Knowledge deficit   Note:    10/4/2024 states doesn't like to test but willing to and log so we can see if medication adjustments needed.   12/2/2024 See log attached. No lows.  Range from 138-176 FBS. Gave her more logs to use and states likes knowing how her glucose is doing and using it as a guide with what she is eating.        Task: Reviewed the importance of self-monitoring blood glucose and keeping logs. Completed 11/4/2024        Task: Provided patient with blood glucose logs, reviewed appropriate timing and frequency to SMBG, education on parameters on when to notify provider and advised patient to bring logs to all appts with PCP/Endocrinologist/Diabetes Care Specialist. Completed 11/4/2024        Task: Discussed ways to minimize pain when monitoring blood glucose. Completed 11/4/2024        Problem: Healthy Eating         Long-Range Goal: Patient agrees to pair carbs with protien for meals and snacks.    Start Date: 12/2/2024   Expected End Date: 3/3/2025   Priority: Medium   Barriers: Knowledge deficit   Note:    12/02/2024 Reviewed food exchange list, snack ideas and meal planning tips and gave handouts.        Task: Review the importance of balancing carbohydrates with each meal using portion control techniques to count servings of carbohydrate and label reading to identify serving size and amount of total carbs per serving. Completed 12/2/2024        Task: Provided Sample plate method and reviewed the use of the plate to  estimate amounts of carbohydrate per meal. Completed 12/2/2024          Follow Up Plan     Follow up in about 3 months (around 3/2/2025) for chronic complications, review goals and meds, continue nutrition tasks . Appt made with pt. Today for 3/3/2025 at 11:00    Today's care plan and follow up schedule was discussed with patient.  Christina verbalized understanding of the care plan, goals, and agrees to follow up plan.        The patient was encouraged to communicate with his/her health care provider/physician and care team regarding his/her condition(s) and treatment.  I provided the patient with my contact information today and encouraged to contact me via phone or [facility] Patient Portal as needed.     Length of Visit   Total Time: 60 Minutes

## 2024-12-09 ENCOUNTER — OFFICE VISIT (OUTPATIENT)
Dept: FAMILY MEDICINE | Facility: CLINIC | Age: 79
End: 2024-12-09
Payer: MEDICARE

## 2024-12-09 DIAGNOSIS — E11.65 TYPE 2 DIABETES MELLITUS WITH HYPERGLYCEMIA, WITH LONG-TERM CURRENT USE OF INSULIN: Primary | ICD-10-CM

## 2024-12-09 DIAGNOSIS — Z79.4 TYPE 2 DIABETES MELLITUS WITH HYPERGLYCEMIA, WITH LONG-TERM CURRENT USE OF INSULIN: Primary | ICD-10-CM

## 2024-12-09 PROCEDURE — 99214 OFFICE O/P EST MOD 30 MIN: CPT | Mod: 95,,, | Performed by: INTERNAL MEDICINE

## 2024-12-09 RX ORDER — INSULIN GLARGINE 100 [IU]/ML
8 INJECTION, SOLUTION SUBCUTANEOUS NIGHTLY
Start: 2024-12-09 | End: 2025-12-09

## 2024-12-09 NOTE — PROGRESS NOTES
TELEMEDICINE VISIT     Patient ID: Christina Car is a 79 y.o. female.  MRN: [id number]  : 1945    Subjective:        TELEMEDICINE  The patient location is: home  The chief complaint leading to consultation is: Diabetes     Visit type: Virtual visit with synchronous audio and video    Total time spent with patient: 10 minutes  15 minutes of total time spent on the encounter, which includes face to face time and non-face to face time preparing to see the patient (eg, review of tests), obtaining and/or reviewing separately obtained history, documenting clinical information in the electronic or other health record, independently interpreting results (not separately reported) and communicating results to the patient/family/caregiver, or care coordination (not separately reported).    Each patient to whom he or she provides medical services by telemedicine is:  (1) informed of the relationship between the physician and patient and the respective role of any other health care provider with respect to management of the patient; and (2) notified that he or she may decline to receive medical services by telemedicine and may withdraw from such care at any time.    HPI: HPI     DM FOLLOW UP  FBG -140  CURRENTLY TAKING   LANTUS 5 UNITS NIGHTLY  JARDIANCE 25 MG PO DAILY  TRULICITY 4.5 MG SUBQ WEEKLY  GLIPIZIDE 5 MG PO BID  NO HYPOGLYCEMIA REPORTED       Health maintenance reviewed with the patient.  Health maintenance completed:  Health Maintenance Topics with due status: Not Due       Topic Last Completion Date    DEXA Scan 2022    TETANUS VACCINE 10/03/2022    Diabetes Urine Screening 2024    Lipid Panel 2024    Eye Exam 2024    Hemoglobin A1c 10/24/2024      Health maintenance due:  There are no preventive care reminders to display for this patient.   ROS:  Review of Systems   Constitutional:  Negative for chills, fatigue and fever.   Eyes:  Negative for pain.   Respiratory:  Negative  for cough and shortness of breath.    Cardiovascular:  Negative for chest pain, palpitations and leg swelling.   Gastrointestinal:  Negative for abdominal pain, blood in stool and change in bowel habit.   Genitourinary:  Negative for difficulty urinating.   Musculoskeletal:  Negative for joint swelling.   Integumentary:  Negative for rash.   Neurological:  Negative for dizziness and headaches.      History:     Past Medical History:   Diagnosis Date    Carpal tunnel syndrome     Chronic kidney disease, stage 3a 7/19/2022    HLD (hyperlipidemia)     HTN (hypertension)     Hyperlipidemia associated with type 2 diabetes mellitus 7/19/2022    Hypothyroidism, unspecified     Osteopenia     Type 2 diabetes mellitus with hyperglycemia 7/19/2022    Type 2 diabetes mellitus without complications       Past Surgical History:   Procedure Laterality Date    APPENDECTOMY  2014    biopsy of breast  1994    CATARACT EXTRACTION Bilateral 2018    HYSTERECTOMY       Family History   Problem Relation Name Age of Onset    Diabetes Mother      Cancer Father        Social History     Tobacco Use    Smoking status: Never     Passive exposure: Never    Smokeless tobacco: Never   Substance and Sexual Activity    Alcohol use: Never    Drug use: Never    Sexual activity: Not on file          Allergies: Review of patient's allergies indicates:  No Known Allergies  Objective:   There were no vitals filed for this visit.      Physical Examination: Physical exam was not performed during this virtual visit.  Physical Exam    Labs:   Diagnostic Tests:  Significant Imaging: I have reviewed and interpreted all pertinent imaging results/findings.  X-Ray Lumbar Spine AP And Lateral  Narrative: EXAMINATION:  XR LUMBAR SPINE AP AND LATERAL    CPT: 28852    CLINICAL HISTORY:  Low back pain, unspecified    FINDINGS:  There are 5 non rib-bearing vertebral bodies with a rotatory scoliosis) to the left) with maximum curvature of the level of L3 there are some  degenerative changes with narrowing of disc spaces marginal osteophytes and degenerative changes of the posterior elements at multiple levels no definite acute fractures or dislocations identified  Impression: Significant rotatory levoscoliosis with degenerative changes    Electronically signed by: Thomas Nolan  Date:    09/28/2022  Time:    12:31      Laboratory Data:  All pertinent labs have been reviewed.  I have reviewed the following records today:     Details:   [x] Labs [] Internal  [] External    [] Micro [] Internal  [] External    [] Pathology [] Internal  [] External    [x] Imaging [] Internal  [] External    [x] Cardiology Procedures [] Internal  [] External    [x] Provider Records [] Internal  [] External    [] Other [] Internal  [] External      Labs:   Lab Results   Component Value Date    WBC 9.35 03/22/2024    RBC 4.91 03/22/2024    HGB 14.5 03/22/2024    HCT 44.2 03/22/2024    MCV 90.0 03/22/2024    MCH 29.5 03/22/2024     03/22/2024     07/23/2024    K 4.0 07/23/2024     07/23/2024    BUN 18.1 07/23/2024    CREATININE 0.90 07/23/2024    AST 26 07/23/2024    ALT 37 07/23/2024    BILITOT 0.9 07/23/2024    TSH 0.352 07/23/2024    HGBA1C 8.0 (H) 10/24/2024      Medications:     Medication List with Changes/Refills   Current Medications    AMLODIPINE (NORVASC) 10 MG TABLET    Take 1 tablet by mouth once daily    ASCORBIC ACID, VITAMIN C, (VITAMIN C) 1000 MG TABLET    Take 1,000 mg by mouth once daily.    ATORVASTATIN (LIPITOR) 40 MG TABLET    Take 1 tablet by mouth once daily    BENAZEPRIL (LOTENSIN) 40 MG TABLET    Take 1 tablet by mouth once daily    CHOLECALCIFEROL, VITAMIN D3, (VITAMIN D3) 50 MCG (2,000 UNIT) TAB    Take 2,000 Units by mouth once daily.    CO-ENZYME Q-10 50 MG CAPSULE    Take 100 mg by mouth once daily.    DULAGLUTIDE (TRULICITY) 4.5 MG/0.5 ML PEN INJECTOR    Inject 4.5 mg into the skin every 7 days.    EMPAGLIFLOZIN (JARDIANCE) 25 MG TABLET    TAKE 1 TABLET  "BY MOUTH IN THE MORNING    GLUCOSAMINE-CHONDROITIN 500-400 MG TABLET    Take 1 tablet by mouth 3 (three) times daily.    LACTOBACILLUS RHAMNOSUS GG (CULTURELLE) 10 BILLION CELL CAPSULE    Take 1 capsule by mouth once daily.    LEVOTHYROXINE (SYNTHROID) 75 MCG TABLET    Take 1 tablet (75 mcg total) by mouth before breakfast.    METOPROLOL SUCCINATE (TOPROL-XL) 25 MG 24 HR TABLET    Take 1 tablet by mouth once daily    OMEGA-3 FATTY ACIDS/FISH OIL (FISH OIL-OMEGA-3 FATTY ACIDS) 300-1,000 MG CAPSULE    Take 1 capsule by mouth once daily.    OXYBUTYNIN (DITROPAN) 5 MG TAB    TAKE 1 TABLET BY MOUTH ONCE DAILY IN THE EVENING    PEN NEEDLE, DIABETIC 32 GAUGE X 5/32" NDLE    1 Box by Misc.(Non-Drug; Combo Route) route once daily.   Changed and/or Refilled Medications    Modified Medication Previous Medication    INSULIN GLARGINE U-100, LANTUS, (LANTUS SOLOSTAR U-100 INSULIN) 100 UNIT/ML (3 ML) INPN PEN insulin glargine U-100, Lantus, (LANTUS SOLOSTAR U-100 INSULIN) 100 unit/mL (3 mL) InPn pen       Inject 8 Units into the skin every evening.    Inject 5 Units into the skin every evening.     Assessment:     1. Type 2 diabetes mellitus with hyperglycemia, with long-term current use of insulin      Plan:   Christina "Toni" was seen today for diabetes.    Diagnoses and all orders for this visit:    Type 2 diabetes mellitus with hyperglycemia, with long-term current use of insulin  -     Hemoglobin A1C; Future    Other orders  -     insulin glargine U-100, Lantus, (LANTUS SOLOSTAR U-100 INSULIN) 100 unit/mL (3 mL) InPn pen; Inject 8 Units into the skin every evening.    BG NOT YET AT GOAL  STOP GLIPIZIDE  INCREASE LANTUS TO 8 UNITS SUBQ AT NIGHT FOR THE NEXT WEEK; IF YOUR FBG REMAINS >130 THEN PLAN TO INCREASE LANTUS TO 10 UNITS SUBQ AT NIGHT FOR THE FOLLOWING WEEK AND STAY ON THAT  I DID CONFIRM WITH HER THAT SHE WOULD REMAIN ON HER JARDIANCE AND TRULICITY AND HAD HER READ BACK HER MEDICATIONS FOR DM AND DIRECTIONS TO " FOLLOW  WILL NEED A1C AND F/U IN 6 WEEK- VIRTUAL IS OK    Follow Up:   Follow up in about 6 weeks (around 1/20/2025) for Virtual Visit, 15 MIN OKAY.    I spent greater than 15 minutes today both in chart review and greater than 50% of that time in discussion with the patient regarding health maintenance, diagnoses, diagnostic tests, medications, treatments, symptom management, expected results and adverse effects. Patient verbalized understanding and all questions were answered.

## 2025-02-06 ENCOUNTER — OFFICE VISIT (OUTPATIENT)
Dept: FAMILY MEDICINE | Facility: CLINIC | Age: 80
End: 2025-02-06
Payer: MEDICARE

## 2025-02-06 ENCOUNTER — LAB VISIT (OUTPATIENT)
Dept: LAB | Facility: HOSPITAL | Age: 80
End: 2025-02-06
Attending: INTERNAL MEDICINE
Payer: MEDICARE

## 2025-02-06 DIAGNOSIS — N18.31 CHRONIC KIDNEY DISEASE, STAGE 3A: ICD-10-CM

## 2025-02-06 DIAGNOSIS — E78.5 HYPERLIPIDEMIA ASSOCIATED WITH TYPE 2 DIABETES MELLITUS: ICD-10-CM

## 2025-02-06 DIAGNOSIS — E21.3 HYPERPARATHYROIDISM: ICD-10-CM

## 2025-02-06 DIAGNOSIS — E11.69 HYPERLIPIDEMIA ASSOCIATED WITH TYPE 2 DIABETES MELLITUS: ICD-10-CM

## 2025-02-06 DIAGNOSIS — E11.65 TYPE 2 DIABETES MELLITUS WITH HYPERGLYCEMIA, WITH LONG-TERM CURRENT USE OF INSULIN: Primary | ICD-10-CM

## 2025-02-06 DIAGNOSIS — Z79.4 TYPE 2 DIABETES MELLITUS WITH HYPERGLYCEMIA, WITH LONG-TERM CURRENT USE OF INSULIN: ICD-10-CM

## 2025-02-06 DIAGNOSIS — E11.65 TYPE 2 DIABETES MELLITUS WITH HYPERGLYCEMIA, WITH LONG-TERM CURRENT USE OF INSULIN: ICD-10-CM

## 2025-02-06 DIAGNOSIS — Z79.4 TYPE 2 DIABETES MELLITUS WITH HYPERGLYCEMIA, WITH LONG-TERM CURRENT USE OF INSULIN: Primary | ICD-10-CM

## 2025-02-06 LAB
EST. AVERAGE GLUCOSE BLD GHB EST-MCNC: 197.3 MG/DL
HBA1C MFR BLD: 8.5 %

## 2025-02-06 PROCEDURE — 83036 HEMOGLOBIN GLYCOSYLATED A1C: CPT

## 2025-02-06 PROCEDURE — 98014 SYNCH AUDIO-ONLY EST MOD 30: CPT | Mod: 93,,, | Performed by: INTERNAL MEDICINE

## 2025-02-06 PROCEDURE — 36415 COLL VENOUS BLD VENIPUNCTURE: CPT

## 2025-02-06 RX ORDER — INSULIN GLARGINE 100 [IU]/ML
10 INJECTION, SOLUTION SUBCUTANEOUS NIGHTLY
Start: 2025-02-06 | End: 2025-02-06

## 2025-02-06 RX ORDER — INSULIN GLARGINE 100 [IU]/ML
10 INJECTION, SOLUTION SUBCUTANEOUS DAILY
Start: 2025-02-06 | End: 2025-02-27

## 2025-02-06 NOTE — PROGRESS NOTES
TELEMEDICINE VISIT     Patient ID: Christina Car is a 79 y.o. female.  MRN: [id number]  : 1945    Subjective:        TELEMEDICINE  The patient location is: home  The chief complaint leading to consultation is: Diabetes     Visit type: Virtual visit with synchronous audio and video  Of note no video connection available    Total time spent with patient: 15 minutes  20 minutes of total time spent on the encounter, which includes face to face time and non-face to face time preparing to see the patient (eg, review of tests), obtaining and/or reviewing separately obtained history, documenting clinical information in the electronic or other health record, independently interpreting results (not separately reported) and communicating results to the patient/family/caregiver, or care coordination (not separately reported).    Each patient to whom he or she provides medical services by telemedicine is:  (1) informed of the relationship between the physician and patient and the respective role of any other health care provider with respect to management of the patient; and (2) notified that he or she may decline to receive medical services by telemedicine and may withdraw from such care at any time.    HPI: HPI     Type 2 DM follow up  BG ranging from 104-150s in morning  Only checks in morning  Compliant with medication  Feels like when FBG is 104 she feels tired, less energy  No dietary changes  She does report skipping meals    CURRENTLY TAKING   LANTUS 8 UNITS NIGHTLY  JARDIANCE 25 MG PO DAILY  TRULICITY 4.5 MG SUBQ WEEKLY    Health maintenance reviewed with the patient.  Health maintenance completed:  Health Maintenance Topics with due status: Not Due       Topic Last Completion Date    DEXA Scan 2022    TETANUS VACCINE 10/03/2022    Diabetes Urine Screening 2024    Lipid Panel 2024    Diabetic Eye Exam 2024    Hemoglobin A1c 2025      Health maintenance due:  There are no  preventive care reminders to display for this patient.   ROS:  Review of Systems   Constitutional:  Negative for chills, fatigue and fever.   Eyes:  Negative for pain.   Respiratory:  Negative for cough and shortness of breath.    Cardiovascular:  Negative for chest pain, palpitations and leg swelling.   Gastrointestinal:  Negative for abdominal pain, blood in stool and change in bowel habit.   Genitourinary:  Negative for difficulty urinating.   Musculoskeletal:  Negative for joint swelling.   Integumentary:  Negative for rash.   Neurological:  Negative for dizziness and headaches.      History:     Past Medical History:   Diagnosis Date    Carpal tunnel syndrome     Chronic kidney disease, stage 3a 7/19/2022    HLD (hyperlipidemia)     HTN (hypertension)     Hyperlipidemia associated with type 2 diabetes mellitus 7/19/2022    Hypothyroidism, unspecified     Osteopenia     Type 2 diabetes mellitus with hyperglycemia 7/19/2022    Type 2 diabetes mellitus without complications       Past Surgical History:   Procedure Laterality Date    APPENDECTOMY  2014    biopsy of breast  1994    CATARACT EXTRACTION Bilateral 2018    HYSTERECTOMY       Family History   Problem Relation Name Age of Onset    Diabetes Mother      Cancer Father        Social History     Tobacco Use    Smoking status: Never     Passive exposure: Never    Smokeless tobacco: Never   Substance and Sexual Activity    Alcohol use: Never    Drug use: Never    Sexual activity: Not on file          Allergies: Review of patient's allergies indicates:  No Known Allergies  Objective:   There were no vitals filed for this visit.      Physical Examination: Physical exam was not performed during this virtual visit.  Physical Exam    Labs:   Diagnostic Tests:  Significant Imaging: I have reviewed and interpreted all pertinent imaging results/findings.  X-Ray Lumbar Spine AP And Lateral  Narrative: EXAMINATION:  XR LUMBAR SPINE AP AND LATERAL    CPT: 10280    CLINICAL  HISTORY:  Low back pain, unspecified    FINDINGS:  There are 5 non rib-bearing vertebral bodies with a rotatory scoliosis) to the left) with maximum curvature of the level of L3 there are some degenerative changes with narrowing of disc spaces marginal osteophytes and degenerative changes of the posterior elements at multiple levels no definite acute fractures or dislocations identified  Impression: Significant rotatory levoscoliosis with degenerative changes    Electronically signed by: Thomas Nolan  Date:    09/28/2022  Time:    12:31      Laboratory Data:  All pertinent labs have been reviewed.  I have reviewed the following records today:     Details:   [x] Labs [] Internal  [] External    [] Micro [] Internal  [] External    [] Pathology [] Internal  [] External    [x] Imaging [] Internal  [] External    [x] Cardiology Procedures [] Internal  [] External    [x] Provider Records [] Internal  [] External    [] Other [] Internal  [] External      Labs:   Lab Results   Component Value Date    WBC 9.35 03/22/2024    RBC 4.91 03/22/2024    HGB 14.5 03/22/2024    HCT 44.2 03/22/2024    MCV 90.0 03/22/2024    MCH 29.5 03/22/2024     03/22/2024     07/23/2024    K 4.0 07/23/2024     07/23/2024    BUN 18.1 07/23/2024    CREATININE 0.90 07/23/2024    AST 26 07/23/2024    ALT 37 07/23/2024    BILITOT 0.9 07/23/2024    TSH 0.352 07/23/2024    HGBA1C 8.5 (H) 02/06/2025      Medications:     Medication List with Changes/Refills   Current Medications    AMLODIPINE (NORVASC) 10 MG TABLET    Take 1 tablet by mouth once daily    ASCORBIC ACID, VITAMIN C, (VITAMIN C) 1000 MG TABLET    Take 1,000 mg by mouth once daily.    ATORVASTATIN (LIPITOR) 40 MG TABLET    Take 1 tablet by mouth once daily    BENAZEPRIL (LOTENSIN) 40 MG TABLET    Take 1 tablet by mouth once daily    CHOLECALCIFEROL, VITAMIN D3, (VITAMIN D3) 50 MCG (2,000 UNIT) TAB    Take 2,000 Units by mouth once daily.    CO-ENZYME Q-10 50 MG CAPSULE   "  Take 100 mg by mouth once daily.    DULAGLUTIDE (TRULICITY) 4.5 MG/0.5 ML PEN INJECTOR    Inject 4.5 mg into the skin every 7 days.    EMPAGLIFLOZIN (JARDIANCE) 25 MG TABLET    TAKE 1 TABLET BY MOUTH IN THE MORNING    GLUCOSAMINE-CHONDROITIN 500-400 MG TABLET    Take 1 tablet by mouth 3 (three) times daily.    LACTOBACILLUS RHAMNOSUS GG (CULTURELLE) 10 BILLION CELL CAPSULE    Take 1 capsule by mouth once daily.    LEVOTHYROXINE (SYNTHROID) 75 MCG TABLET    Take 1 tablet (75 mcg total) by mouth before breakfast.    METOPROLOL SUCCINATE (TOPROL-XL) 25 MG 24 HR TABLET    Take 1 tablet by mouth once daily    OMEGA-3 FATTY ACIDS/FISH OIL (FISH OIL-OMEGA-3 FATTY ACIDS) 300-1,000 MG CAPSULE    Take 1 capsule by mouth once daily.    PEN NEEDLE, DIABETIC 32 GAUGE X 5/32" NDLE    1 Box by Misc.(Non-Drug; Combo Route) route once daily.   Changed and/or Refilled Medications    Modified Medication Previous Medication    INSULIN GLARGINE U-100, LANTUS, (LANTUS SOLOSTAR U-100 INSULIN) 100 UNIT/ML (3 ML) INPN PEN insulin glargine U-100, Lantus, (LANTUS SOLOSTAR U-100 INSULIN) 100 unit/mL (3 mL) InPn pen       Inject 10 Units into the skin once daily.    Inject 8 Units into the skin every evening.   Discontinued Medications    OXYBUTYNIN (DITROPAN) 5 MG TAB    TAKE 1 TABLET BY MOUTH ONCE DAILY IN THE EVENING     Assessment:     1. Type 2 diabetes mellitus with hyperglycemia, with long-term current use of insulin    2. Hyperparathyroidism    3. Hyperlipidemia associated with type 2 diabetes mellitus    4. Chronic kidney disease, stage 3a      Plan:   Christina Alan" was seen today for diabetes.    Diagnoses and all orders for this visit:    Type 2 diabetes mellitus with hyperglycemia, with long-term current use of insulin  Uncontrolled  A1c rising  Continue trulicity  Continue jardiance  Change Lantus- increase to 10 units and move time you take it to the morning  This was repeated and reviewed 4 times on phone with " patient  Follow up in 2 weeks  Advised patient to check home BG at least twice a day  Hyperparathyroidism  PTH high, calcium was high  Waiting for appt with Dr. Duran  Scheduled next month  Hyperlipidemia associated with type 2 diabetes mellitus  Unchnaged  Continue statin  Low carb ada diet advised  Chronic kidney disease, stage 3a  Stable avoid NSAID use  Other orders  -     Discontinue: insulin glargine U-100, Lantus, (LANTUS SOLOSTAR U-100 INSULIN) 100 unit/mL (3 mL) InPn pen; Inject 10 Units into the skin every evening.  -     insulin glargine U-100, Lantus, (LANTUS SOLOSTAR U-100 INSULIN) 100 unit/mL (3 mL) InPn pen; Inject 10 Units into the skin once daily.        Follow Up:   Follow up in about 2 weeks (around 2/20/2025) for Virtual Visit.    I spent greater than 20 minutes today both in chart review and greater than 50% of that time in discussion with the patient regarding health maintenance, diagnoses, diagnostic tests, medications, treatments, symptom management, expected results and adverse effects. Patient verbalized understanding and all questions were answered.      note that >10 minutes were spent in medical discussion with the patient

## 2025-02-27 ENCOUNTER — OFFICE VISIT (OUTPATIENT)
Dept: FAMILY MEDICINE | Facility: CLINIC | Age: 80
End: 2025-02-27
Payer: MEDICARE

## 2025-02-27 DIAGNOSIS — Z79.4 TYPE 2 DIABETES MELLITUS WITH HYPERGLYCEMIA, WITH LONG-TERM CURRENT USE OF INSULIN: Primary | ICD-10-CM

## 2025-02-27 DIAGNOSIS — E11.59 HYPERTENSION ASSOCIATED WITH TYPE 2 DIABETES MELLITUS: ICD-10-CM

## 2025-02-27 DIAGNOSIS — N18.31 CHRONIC KIDNEY DISEASE, STAGE 3A: ICD-10-CM

## 2025-02-27 DIAGNOSIS — E03.9 ACQUIRED HYPOTHYROIDISM: ICD-10-CM

## 2025-02-27 DIAGNOSIS — I15.2 HYPERTENSION ASSOCIATED WITH TYPE 2 DIABETES MELLITUS: ICD-10-CM

## 2025-02-27 DIAGNOSIS — E78.5 HYPERLIPIDEMIA ASSOCIATED WITH TYPE 2 DIABETES MELLITUS: ICD-10-CM

## 2025-02-27 DIAGNOSIS — E11.65 TYPE 2 DIABETES MELLITUS WITH HYPERGLYCEMIA, WITH LONG-TERM CURRENT USE OF INSULIN: Primary | ICD-10-CM

## 2025-02-27 DIAGNOSIS — E11.69 HYPERLIPIDEMIA ASSOCIATED WITH TYPE 2 DIABETES MELLITUS: ICD-10-CM

## 2025-02-27 RX ORDER — INSULIN GLARGINE 100 [IU]/ML
12 INJECTION, SOLUTION SUBCUTANEOUS DAILY
Qty: 3.6 ML | Refills: 11 | Status: SHIPPED | OUTPATIENT
Start: 2025-02-27 | End: 2026-02-27

## 2025-02-27 NOTE — PROGRESS NOTES
TELEMEDICINE VISIT     Patient ID: Christina Car is a 79 y.o. female.  MRN: [id number]  : 1945    Subjective:        TELEMEDICINE  The patient location is: home  The chief complaint leading to consultation is: Diabetes     Visit type: Virtual visit with synchronous audio and video; of note no video connection available today    Total time spent with patient: 10 minutes  15 minutes of total time spent on the encounter, which includes face to face time and non-face to face time preparing to see the patient (eg, review of tests), obtaining and/or reviewing separately obtained history, documenting clinical information in the electronic or other health record, independently interpreting results (not separately reported) and communicating results to the patient/family/caregiver, or care coordination (not separately reported).    Each patient to whom he or she provides medical services by telemedicine is:  (1) informed of the relationship between the physician and patient and the respective role of any other health care provider with respect to management of the patient; and (2) notified that he or she may decline to receive medical services by telemedicine and may withdraw from such care at any time.    HPI: HPI       DM follow up  Uncontrolled BG  A1c was rising  At last OV advised her to increase Lantus to 10 units QAM and continue jardiance  TRULICITY 4.5 MG SUBQ WEEKLY     , 132, 170 (rice that day), 155, 148    Health maintenance reviewed with the patient.  Health maintenance completed:  Health Maintenance Topics with due status: Not Due       Topic Last Completion Date    DEXA Scan 2022    TETANUS VACCINE 10/03/2022    Diabetic Eye Exam 2024    Hemoglobin A1c 2025      Health maintenance due:  Health Maintenance Due   Topic Date Due    Diabetes Urine Screening  2025    Lipid Panel  2025      ROS:  Review of Systems   Constitutional:  Negative for chills, fatigue and  fever.   Eyes:  Negative for pain.   Respiratory:  Negative for cough and shortness of breath.    Cardiovascular:  Negative for chest pain, palpitations and leg swelling.   Gastrointestinal:  Negative for abdominal pain, blood in stool and change in bowel habit.   Genitourinary:  Negative for difficulty urinating.   Musculoskeletal:  Negative for joint swelling.   Integumentary:  Negative for rash.   Neurological:  Negative for dizziness and headaches.      History:     Past Medical History:   Diagnosis Date    Carpal tunnel syndrome     Chronic kidney disease, stage 3a 7/19/2022    HLD (hyperlipidemia)     HTN (hypertension)     Hyperlipidemia associated with type 2 diabetes mellitus 7/19/2022    Hypothyroidism, unspecified     Osteopenia     Type 2 diabetes mellitus with hyperglycemia 7/19/2022    Type 2 diabetes mellitus without complications       Past Surgical History:   Procedure Laterality Date    APPENDECTOMY  2014    biopsy of breast  1994    CATARACT EXTRACTION Bilateral 2018    HYSTERECTOMY       Family History   Problem Relation Name Age of Onset    Diabetes Mother      Cancer Father        Social History     Tobacco Use    Smoking status: Never     Passive exposure: Never    Smokeless tobacco: Never   Substance and Sexual Activity    Alcohol use: Never    Drug use: Never    Sexual activity: Not on file          Allergies: Review of patient's allergies indicates:  No Known Allergies  Objective:   There were no vitals filed for this visit.      Physical Examination: Physical exam was not performed during this virtual visit.  Physical Exam    Labs:   Diagnostic Tests:  Significant Imaging: I have reviewed and interpreted all pertinent imaging results/findings.  X-Ray Lumbar Spine AP And Lateral  Narrative: EXAMINATION:  XR LUMBAR SPINE AP AND LATERAL    CPT: 60256    CLINICAL HISTORY:  Low back pain, unspecified    FINDINGS:  There are 5 non rib-bearing vertebral bodies with a rotatory scoliosis) to the  left) with maximum curvature of the level of L3 there are some degenerative changes with narrowing of disc spaces marginal osteophytes and degenerative changes of the posterior elements at multiple levels no definite acute fractures or dislocations identified  Impression: Significant rotatory levoscoliosis with degenerative changes    Electronically signed by: Thomas Nolan  Date:    09/28/2022  Time:    12:31      Laboratory Data:  All pertinent labs have been reviewed.  I have reviewed the following records today:     Details:   [x] Labs [] Internal  [] External    [] Micro [] Internal  [] External    [] Pathology [] Internal  [] External    [x] Imaging [] Internal  [] External    [x] Cardiology Procedures [] Internal  [] External    [x] Provider Records [] Internal  [] External    [] Other [] Internal  [] External      Labs:   Lab Results   Component Value Date    WBC 9.35 03/22/2024    RBC 4.91 03/22/2024    HGB 14.5 03/22/2024    HCT 44.2 03/22/2024    MCV 90.0 03/22/2024    MCH 29.5 03/22/2024     03/22/2024     07/23/2024    K 4.0 07/23/2024     07/23/2024    BUN 18.1 07/23/2024    CREATININE 0.90 07/23/2024    AST 26 07/23/2024    ALT 37 07/23/2024    BILITOT 0.9 07/23/2024    TSH 0.352 07/23/2024    HGBA1C 8.5 (H) 02/06/2025      Medications:     Medication List with Changes/Refills   Current Medications    AMLODIPINE (NORVASC) 10 MG TABLET    Take 1 tablet by mouth once daily    ASCORBIC ACID, VITAMIN C, (VITAMIN C) 1000 MG TABLET    Take 1,000 mg by mouth once daily.    ATORVASTATIN (LIPITOR) 40 MG TABLET    Take 1 tablet by mouth once daily    BENAZEPRIL (LOTENSIN) 40 MG TABLET    Take 1 tablet by mouth once daily    CHOLECALCIFEROL, VITAMIN D3, (VITAMIN D3) 50 MCG (2,000 UNIT) TAB    Take 2,000 Units by mouth once daily.    CO-ENZYME Q-10 50 MG CAPSULE    Take 100 mg by mouth once daily.    DULAGLUTIDE (TRULICITY) 4.5 MG/0.5 ML PEN INJECTOR    Inject 4.5 mg into the skin every 7  "days.    EMPAGLIFLOZIN (JARDIANCE) 25 MG TABLET    TAKE 1 TABLET BY MOUTH IN THE MORNING    GLUCOSAMINE-CHONDROITIN 500-400 MG TABLET    Take 1 tablet by mouth 3 (three) times daily.    LACTOBACILLUS RHAMNOSUS GG (CULTURELLE) 10 BILLION CELL CAPSULE    Take 1 capsule by mouth once daily.    LEVOTHYROXINE (SYNTHROID) 75 MCG TABLET    Take 1 tablet (75 mcg total) by mouth before breakfast.    METOPROLOL SUCCINATE (TOPROL-XL) 25 MG 24 HR TABLET    Take 1 tablet by mouth once daily    OMEGA-3 FATTY ACIDS/FISH OIL (FISH OIL-OMEGA-3 FATTY ACIDS) 300-1,000 MG CAPSULE    Take 1 capsule by mouth once daily.    PEN NEEDLE, DIABETIC 32 GAUGE X 5/32" NDLE    1 Box by Misc.(Non-Drug; Combo Route) route once daily.   Changed and/or Refilled Medications    Modified Medication Previous Medication    INSULIN GLARGINE U-100, LANTUS, (LANTUS SOLOSTAR U-100 INSULIN) 100 UNIT/ML (3 ML) INPN PEN insulin glargine U-100, Lantus, (LANTUS SOLOSTAR U-100 INSULIN) 100 unit/mL (3 mL) InPn pen       Inject 12 Units into the skin once daily.    Inject 10 Units into the skin once daily.     Assessment:     1. Type 2 diabetes mellitus with hyperglycemia, with long-term current use of insulin      Plan:   Christina "Toni" was seen today for diabetes.    Diagnoses and all orders for this visit:    Type 2 diabetes mellitus with hyperglycemia, with long-term current use of insulin    Other orders  -     insulin glargine U-100, Lantus, (LANTUS SOLOSTAR U-100 INSULIN) 100 unit/mL (3 mL) InPn pen; Inject 12 Units into the skin once daily.    BG improving but still high  She is only checking BG once a day  Increase lantus to 12 units subQ QAM  Continue jardiance and continue trulicity  Work on a lower carb diet, continue walking  Labs with f/u in 3 mos    Follow Up:   Follow up in about 3 months (around 5/27/2025) for Follow Up - Chronic Conditions.    I spent greater than 15 minutes today both in chart review and greater than 50% of that time in " discussion with the patient regarding health maintenance, diagnoses, diagnostic tests, medications, treatments, symptom management, expected results and adverse effects. Patient verbalized understanding and all questions were answered.

## 2025-02-27 NOTE — Clinical Note
Patient needs 3 month f/u in person with labs due before, orders are in; please call her to schedule

## 2025-03-03 DIAGNOSIS — E11.59 HYPERTENSION ASSOCIATED WITH TYPE 2 DIABETES MELLITUS: ICD-10-CM

## 2025-03-03 DIAGNOSIS — I15.2 HYPERTENSION ASSOCIATED WITH TYPE 2 DIABETES MELLITUS: ICD-10-CM

## 2025-03-03 RX ORDER — METOPROLOL SUCCINATE 25 MG/1
25 TABLET, EXTENDED RELEASE ORAL
Qty: 90 TABLET | Refills: 0 | Status: SHIPPED | OUTPATIENT
Start: 2025-03-03

## 2025-03-09 DIAGNOSIS — I15.2 HYPERTENSION ASSOCIATED WITH TYPE 2 DIABETES MELLITUS: ICD-10-CM

## 2025-03-09 DIAGNOSIS — E11.65 TYPE 2 DIABETES MELLITUS WITH HYPERGLYCEMIA, WITHOUT LONG-TERM CURRENT USE OF INSULIN: ICD-10-CM

## 2025-03-09 DIAGNOSIS — E11.59 HYPERTENSION ASSOCIATED WITH TYPE 2 DIABETES MELLITUS: ICD-10-CM

## 2025-03-10 DIAGNOSIS — E11.59 HYPERTENSION ASSOCIATED WITH TYPE 2 DIABETES MELLITUS: ICD-10-CM

## 2025-03-10 DIAGNOSIS — I15.2 HYPERTENSION ASSOCIATED WITH TYPE 2 DIABETES MELLITUS: ICD-10-CM

## 2025-03-10 DIAGNOSIS — E11.65 TYPE 2 DIABETES MELLITUS WITH HYPERGLYCEMIA, WITHOUT LONG-TERM CURRENT USE OF INSULIN: ICD-10-CM

## 2025-03-10 RX ORDER — EMPAGLIFLOZIN 25 MG/1
25 TABLET, FILM COATED ORAL EVERY MORNING
Qty: 90 TABLET | Refills: 0 | Status: SHIPPED | OUTPATIENT
Start: 2025-03-10

## 2025-03-10 RX ORDER — BENAZEPRIL HYDROCHLORIDE 40 MG/1
40 TABLET ORAL
Qty: 90 TABLET | Refills: 0 | Status: SHIPPED | OUTPATIENT
Start: 2025-03-10

## 2025-03-18 DIAGNOSIS — E03.9 HYPOTHYROIDISM, UNSPECIFIED TYPE: ICD-10-CM

## 2025-03-18 RX ORDER — LEVOTHYROXINE SODIUM 75 UG/1
75 TABLET ORAL
Qty: 90 TABLET | Refills: 3 | Status: SHIPPED | OUTPATIENT
Start: 2025-03-18

## 2025-03-24 ENCOUNTER — LAB VISIT (OUTPATIENT)
Dept: LAB | Facility: HOSPITAL | Age: 80
End: 2025-03-24
Attending: INTERNAL MEDICINE
Payer: MEDICARE

## 2025-03-24 DIAGNOSIS — E21.0 PRIMARY HYPERPARATHYROIDISM: Primary | ICD-10-CM

## 2025-03-24 LAB
25(OH)D3+25(OH)D2 SERPL-MCNC: 36 NG/ML (ref 30–80)
ALBUMIN SERPL-MCNC: 4 G/DL (ref 3.4–4.8)
BUN SERPL-MCNC: 14.5 MG/DL (ref 9.8–20.1)
CALCIUM SERPL-MCNC: 9.7 MG/DL (ref 8.4–10.2)
CHLORIDE SERPL-SCNC: 110 MMOL/L (ref 98–107)
CO2 SERPL-SCNC: 24 MMOL/L (ref 23–31)
CREAT SERPL-MCNC: 0.9 MG/DL (ref 0.55–1.02)
GFR SERPLBLD CREATININE-BSD FMLA CKD-EPI: >60 ML/MIN/1.73/M2
GLUCOSE SERPL-MCNC: 104 MG/DL (ref 82–115)
PHOSPHATE SERPL-MCNC: 3.8 MG/DL (ref 2.3–4.7)
POTASSIUM SERPL-SCNC: 3.6 MMOL/L (ref 3.5–5.1)
PTH-INTACT SERPL-MCNC: 145.1 PG/ML (ref 8.7–77)
SODIUM SERPL-SCNC: 144 MMOL/L (ref 136–145)

## 2025-03-24 PROCEDURE — 80069 RENAL FUNCTION PANEL: CPT

## 2025-03-24 PROCEDURE — 82306 VITAMIN D 25 HYDROXY: CPT

## 2025-03-24 PROCEDURE — 83970 ASSAY OF PARATHORMONE: CPT

## 2025-03-24 PROCEDURE — 36415 COLL VENOUS BLD VENIPUNCTURE: CPT

## 2025-03-31 ENCOUNTER — CLINICAL SUPPORT (OUTPATIENT)
Dept: DIABETES | Facility: CLINIC | Age: 80
End: 2025-03-31
Payer: MEDICARE

## 2025-03-31 ENCOUNTER — TELEPHONE (OUTPATIENT)
Dept: DIABETES | Facility: CLINIC | Age: 80
End: 2025-03-31

## 2025-03-31 VITALS — BODY MASS INDEX: 17.92 KG/M2 | WEIGHT: 97.38 LBS | HEIGHT: 62 IN

## 2025-03-31 DIAGNOSIS — Z79.4 TYPE 2 DIABETES MELLITUS WITH HYPERGLYCEMIA, WITH LONG-TERM CURRENT USE OF INSULIN: Primary | ICD-10-CM

## 2025-03-31 DIAGNOSIS — E11.65 TYPE 2 DIABETES MELLITUS WITH HYPERGLYCEMIA, WITH LONG-TERM CURRENT USE OF INSULIN: Primary | ICD-10-CM

## 2025-03-31 RX ORDER — INSULIN GLARGINE 100 [IU]/ML
14 INJECTION, SOLUTION SUBCUTANEOUS DAILY
Start: 2025-03-31 | End: 2026-03-31

## 2025-03-31 NOTE — TELEPHONE ENCOUNTER
In office today.  See glucose log attached. She is doing well with Taking Lantus 12 units every am.  Her next appt is end of May.  Do you want her to Increase Lantus?   FBS only she is forgetting pm testing but will work on it.   Low of 102.

## 2025-03-31 NOTE — PROGRESS NOTES
"Diabetes Care Specialist Follow-up Note  Author: Renee Patterson RN  Date: 3/31/2025    Intake    Program Intake  Reason for Diabetes Program Visit:: Intervention  Type of Intervention:: Individual  Individual: Education  Education: Nutrition and Meal Planning  Current diabetes risk level:: moderate  In the last month, have you used the ER or been admitted to the hospital: No  Permission to speak with others about care:: yes    Current Diabetes Treatment: Oral Medications, DM Injectables, Insulin  Oral Medication Type/Dose: Jardiance 25mg daily  DM Injectables Type/Dose: Trulicity 4.5mg every thursday  Method of insulin delivery?: Injections  Pen Type/Dose: [id number] units Every AM    Continuous Glucose Monitoring  Patient has CGM: No    Lab Results   Component Value Date    HGBA1C 8.5 (H) 02/06/2025     A1c Pre Diabetes Care Specialist Intervention:  8.0%                               10/24/2024       Weight: 44.2 kg (97 lb 6.4 oz)   Height: 5' 2" (157.5 cm)   Body mass index is 17.81 kg/m².  Wt loss of 5 lbs since last visit on 12/2/2024      Physical activity/Exercise:   States very active during the day takes care of lots of cats and dogs.     SMBG: FBS see log attached     Diabetes Self-Management Skills Assessment    Chronic Complications  Reviewed health maintenance: yes  Have you completed your annual diabetes maintenance labwork? : yes  Do you examine your feet daily?: yes  Has your doctor examined your feet?: yes  Do you see a Dentist?: yes  Dentist date of last visit:: March 2025  Dr. Correia  Do you see an eye doctor?: yes  Eye doctor date of last visit:: annually 8/2024 last visit    Dr. Wyman  Chronic Complications Skills Assessment Completed: : Yes  Assessment indicates:: Adequate understanding  Area of need?: No    During today's follow-up visit,  the following areas required further assessment and content was provided/reviewed.    Based on today's diabetes care assessment, the following areas of " need were identified:      Identified Areas of Need      Medication/Current Diabetes Treatment:  Yes see care plan    Lifestyle Coping/Support:  No    Diabetes Disease Process/Treatment Options:  No   Nutrition/Healthy Eating:   yes see care plan    Physical Activity/Exercise:   No   Home Blood Glucose Monitoring:   yes see care plan    Acute Complications:   Discussed 12/2/24    Chronic Complications: No Discussed importance of A1c less than 7 to reduce risk of micro and macro complications, including nephropathy, neuropathy, retinopathy, heart attack and stroke. Reviewed the importance of controlled Blood Pressure and Cholesterol Lab Values in preventing disease.   Health maintenance reviewed       Today's interventions were provided through individual discussion, instruction, and written materials were provided.    Patient verbalized understanding of instruction and written materials.  Pt was able to return back demonstration of instructions today. Patient understood key points, needs reinforcement and further instruction.     Diabetes Self-Management Care Plan Review and Evaluation of Progress:    During today's follow-up Christina's Diabetes Self-Management Care Plan progress was reviewed and progress was evaluated including his/her input. Christina has agreed to continue his/her journey to improve/maintain overall diabetes control by continuing to set health goals. See care plan progress below.      Care Plan: Diabetes Management   Updates made since 3/31/2024 12:00 AM        Problem: Medications         Long-Range Goal: Patient Agrees to take Lantus 5 units every night. Completed 3/31/2025   Start Date: 11/4/2024   Expected End Date: 12/2/2024   This Visit's Progress: Met   Recent Progress: On track   Priority: High   Barriers: Knowledge deficit   Note:    10/4/2024 New rx for Lantus 5 units qhs, She was able to give shot in office today properly and feel better about taking insulin now.    12/2/2024 States  was taking 2 units due to misunderstanding.  She will now take 5units qhs and use 2 units only to prime a new pen. She was also still taking glipizide and jardiance.  Pcp last notes states she could not afford jardiance and d/c glipizide, note sent to pcp for medication clarification   3/31/25 States insulin increased to 12 units daily, doing well with dosing and logging doses.         Task: Reviewed with patient all current diabetes medications and provided basic review of the purpose, dosage, frequency, side effects, and storage of both oral and injectable diabetes medications. Completed 11/4/2024        Task: Instructed patient on how to self-administer Lantus Completed 11/4/2024        Task: Discussed guidelines for preventing, detecting and treating hypoglycemia and hyperglycemia and reviewed the importance of meal and medication timing with diabetes mediations for prevention of hypoglycemia and maximum drug benefit. Completed 11/4/2024        Problem: Blood Glucose Self-Monitoring         Long-Range Goal: Patient agrees to check and record blood sugars daily. FBS Completed 3/31/2025   Start Date: 11/4/2024   Expected End Date: 12/2/2024   This Visit's Progress: Met   Recent Progress: Met   Priority: Medium   Barriers: Knowledge deficit   Note:    10/4/2024 states doesn't like to test but willing to and log so we can see if medication adjustments needed.   12/2/2024 See log attached. No lows.  Range from 138-176 FBS. Gave her more logs to use and states likes knowing how her glucose is doing and using it as a guide with what she is eating.   3/31/25 See log attached.  Low of 102 high of 184 and 146.  She made comments on some foods.        Task: Reviewed the importance of self-monitoring blood glucose and keeping logs. Completed 11/4/2024        Task: Provided patient with blood glucose logs, reviewed appropriate timing and frequency to SMBG, education on parameters on when to notify provider and advised  patient to bring logs to all appts with PCP/Endocrinologist/Diabetes Care Specialist. Completed 11/4/2024        Task: Discussed ways to minimize pain when monitoring blood glucose. Completed 11/4/2024        Problem: Healthy Eating         Long-Range Goal: Patient agrees to pair carbs with protien for meals and snacks.    Start Date: 12/2/2024   Expected End Date: 3/3/2025   This Visit's Progress: On track   Priority: Medium   Barriers: Knowledge deficit   Note:    12/02/2024 Reviewed food exchange list, snack ideas and meal planning tips and gave handouts.   3/31/25 Reminded to pair carbs with protein. Discussed meal planning and plate method.        Task: Reviewed the sources and role of Carbohydrate, Protein, and Fat and how each nutrient impacts blood sugar. Completed 3/31/25        Task: Provided visual examples using dry measuring cups, food models, and other familiar objects such as computer mouse, deck or cards, tennis ball etc. to help with visualization of portions. Completed 3/31/25     Task: Recommended replacing beverages containing high sugar content with noncaloric/sugar free options and/or water. Completed 3/31/25        Task: Review the importance of balancing carbohydrates with each meal using portion control techniques to count servings of carbohydrate and label reading to identify serving size and amount of total carbs per serving. Completed 12/2/2024        Task: Provided Sample plate method and reviewed the use of the plate to estimate amounts of carbohydrate per meal. Completed 12/2/2024          Follow Up Plan     Follow up in about 3 months (around 6/30/2025) for review skills, glucose log and medication .Appt made with pt. July 9/2025 at 1000    Today's care plan and follow up schedule was discussed with patient.  Crhistina verbalized understanding of the care plan, goals, and agrees to follow up plan.        The patient was encouraged to communicate with his/her health care  provider/physician and care team regarding his/her condition(s) and treatment.  I provided the patient with my contact information today and encouraged to contact me via phone or [facility] Patient Portal as needed.     Length of Visit   Total Time: 60 Minutes

## 2025-05-26 ENCOUNTER — TELEPHONE (OUTPATIENT)
Dept: FAMILY MEDICINE | Facility: CLINIC | Age: 80
End: 2025-05-26
Payer: MEDICARE

## 2025-05-26 NOTE — TELEPHONE ENCOUNTER
Pt confirmed appt on 5-.  Pt notified of labs needing to be completed prior to appt.  Pt verbalized understanding.

## 2025-05-28 ENCOUNTER — LAB VISIT (OUTPATIENT)
Dept: LAB | Facility: HOSPITAL | Age: 80
End: 2025-05-28
Attending: INTERNAL MEDICINE
Payer: MEDICARE

## 2025-05-28 ENCOUNTER — RESULTS FOLLOW-UP (OUTPATIENT)
Dept: FAMILY MEDICINE | Facility: CLINIC | Age: 80
End: 2025-05-28

## 2025-05-28 DIAGNOSIS — E03.9 ACQUIRED HYPOTHYROIDISM: ICD-10-CM

## 2025-05-28 DIAGNOSIS — E78.5 HYPERLIPIDEMIA ASSOCIATED WITH TYPE 2 DIABETES MELLITUS: ICD-10-CM

## 2025-05-28 DIAGNOSIS — E11.69 HYPERLIPIDEMIA ASSOCIATED WITH TYPE 2 DIABETES MELLITUS: ICD-10-CM

## 2025-05-28 DIAGNOSIS — E11.59 HYPERTENSION ASSOCIATED WITH TYPE 2 DIABETES MELLITUS: ICD-10-CM

## 2025-05-28 DIAGNOSIS — Z79.4 TYPE 2 DIABETES MELLITUS WITH HYPERGLYCEMIA, WITH LONG-TERM CURRENT USE OF INSULIN: ICD-10-CM

## 2025-05-28 DIAGNOSIS — N18.31 CHRONIC KIDNEY DISEASE, STAGE 3A: ICD-10-CM

## 2025-05-28 DIAGNOSIS — I15.2 HYPERTENSION ASSOCIATED WITH TYPE 2 DIABETES MELLITUS: ICD-10-CM

## 2025-05-28 DIAGNOSIS — E11.65 TYPE 2 DIABETES MELLITUS WITH HYPERGLYCEMIA, WITH LONG-TERM CURRENT USE OF INSULIN: ICD-10-CM

## 2025-05-28 LAB
ALBUMIN SERPL-MCNC: 3.8 G/DL (ref 3.4–4.8)
ALBUMIN/GLOB SERPL: 1.2 RATIO (ref 1.1–2)
ALP SERPL-CCNC: 91 UNIT/L (ref 40–150)
ALT SERPL-CCNC: 34 UNIT/L (ref 0–55)
ANION GAP SERPL CALC-SCNC: 8 MEQ/L
AST SERPL-CCNC: 30 UNIT/L (ref 11–45)
BASOPHILS # BLD AUTO: 0.07 X10(3)/MCL
BASOPHILS NFR BLD AUTO: 1 %
BILIRUB SERPL-MCNC: 0.5 MG/DL
BILIRUB UR QL STRIP.AUTO: NEGATIVE
BUN SERPL-MCNC: 16 MG/DL (ref 9.8–20.1)
CALCIUM SERPL-MCNC: 9.1 MG/DL (ref 8.4–10.2)
CHLORIDE SERPL-SCNC: 111 MMOL/L (ref 98–107)
CHOLEST SERPL-MCNC: 157 MG/DL
CHOLEST/HDLC SERPL: 2 {RATIO} (ref 0–5)
CLARITY UR: CLEAR
CO2 SERPL-SCNC: 25 MMOL/L (ref 23–31)
COLOR UR AUTO: ABNORMAL
CREAT SERPL-MCNC: 0.82 MG/DL (ref 0.55–1.02)
CREAT UR-MCNC: 45.1 MG/DL (ref 45–106)
CREAT/UREA NIT SERPL: 20
EOSINOPHIL # BLD AUTO: 0.22 X10(3)/MCL (ref 0–0.9)
EOSINOPHIL NFR BLD AUTO: 3.1 %
ERYTHROCYTE [DISTWIDTH] IN BLOOD BY AUTOMATED COUNT: 12.6 % (ref 11.5–17)
EST. AVERAGE GLUCOSE BLD GHB EST-MCNC: 157.1 MG/DL
GFR SERPLBLD CREATININE-BSD FMLA CKD-EPI: >60 ML/MIN/1.73/M2
GLOBULIN SER-MCNC: 3.2 GM/DL (ref 2.4–3.5)
GLUCOSE SERPL-MCNC: 149 MG/DL (ref 82–115)
GLUCOSE UR QL STRIP: >=1000
HBA1C MFR BLD: 7.1 %
HCT VFR BLD AUTO: 43.1 % (ref 37–47)
HDLC SERPL-MCNC: 71 MG/DL (ref 35–60)
HGB BLD-MCNC: 14 G/DL (ref 12–16)
HGB UR QL STRIP: NEGATIVE
IMM GRANULOCYTES # BLD AUTO: 0.01 X10(3)/MCL (ref 0–0.04)
IMM GRANULOCYTES NFR BLD AUTO: 0.1 %
KETONES UR QL STRIP: NEGATIVE
LDLC SERPL CALC-MCNC: 73 MG/DL (ref 50–140)
LEUKOCYTE ESTERASE UR QL STRIP: NEGATIVE
LYMPHOCYTES # BLD AUTO: 1.94 X10(3)/MCL (ref 0.6–4.6)
LYMPHOCYTES NFR BLD AUTO: 27.4 %
MCH RBC QN AUTO: 29.4 PG (ref 27–31)
MCHC RBC AUTO-ENTMCNC: 32.5 G/DL (ref 33–36)
MCV RBC AUTO: 90.4 FL (ref 80–94)
MICROALBUMIN UR-MCNC: 16.8 UG/ML
MICROALBUMIN/CREAT RATIO PNL UR: 37.3 MG/GM CR (ref 0–30)
MONOCYTES # BLD AUTO: 0.52 X10(3)/MCL (ref 0.1–1.3)
MONOCYTES NFR BLD AUTO: 7.4 %
NEUTROPHILS # BLD AUTO: 4.31 X10(3)/MCL (ref 2.1–9.2)
NEUTROPHILS NFR BLD AUTO: 61 %
NITRITE UR QL STRIP: NEGATIVE
NRBC BLD AUTO-RTO: 0 %
PH UR STRIP: 6 [PH]
PLATELET # BLD AUTO: 275 X10(3)/MCL (ref 130–400)
PMV BLD AUTO: 8.1 FL (ref 7.4–10.4)
POTASSIUM SERPL-SCNC: 4 MMOL/L (ref 3.5–5.1)
PROT SERPL-MCNC: 7 GM/DL (ref 5.8–7.6)
PROT UR QL STRIP: NEGATIVE
RBC # BLD AUTO: 4.77 X10(6)/MCL (ref 4.2–5.4)
SODIUM SERPL-SCNC: 144 MMOL/L (ref 136–145)
SP GR UR STRIP.AUTO: 1.01 (ref 1–1.03)
T4 FREE SERPL-MCNC: 1.27 NG/DL (ref 0.7–1.48)
TRIGL SERPL-MCNC: 67 MG/DL (ref 37–140)
TSH SERPL-ACNC: 0.34 UIU/ML (ref 0.35–4.94)
UROBILINOGEN UR STRIP-ACNC: 0.2
VLDLC SERPL CALC-MCNC: 13 MG/DL
WBC # BLD AUTO: 7.07 X10(3)/MCL (ref 4.5–11.5)

## 2025-05-28 PROCEDURE — 80061 LIPID PANEL: CPT

## 2025-05-28 PROCEDURE — 80053 COMPREHEN METABOLIC PANEL: CPT

## 2025-05-28 PROCEDURE — 36415 COLL VENOUS BLD VENIPUNCTURE: CPT

## 2025-05-28 PROCEDURE — 84439 ASSAY OF FREE THYROXINE: CPT

## 2025-05-28 PROCEDURE — 81003 URINALYSIS AUTO W/O SCOPE: CPT

## 2025-05-28 PROCEDURE — 82043 UR ALBUMIN QUANTITATIVE: CPT

## 2025-05-28 PROCEDURE — 83036 HEMOGLOBIN GLYCOSYLATED A1C: CPT

## 2025-05-28 PROCEDURE — 84443 ASSAY THYROID STIM HORMONE: CPT

## 2025-05-28 PROCEDURE — 85025 COMPLETE CBC W/AUTO DIFF WBC: CPT

## 2025-05-29 ENCOUNTER — OFFICE VISIT (OUTPATIENT)
Dept: FAMILY MEDICINE | Facility: CLINIC | Age: 80
End: 2025-05-29
Payer: MEDICARE

## 2025-05-29 VITALS
HEIGHT: 62 IN | WEIGHT: 95.63 LBS | DIASTOLIC BLOOD PRESSURE: 62 MMHG | BODY MASS INDEX: 17.6 KG/M2 | SYSTOLIC BLOOD PRESSURE: 120 MMHG | HEART RATE: 69 BPM | TEMPERATURE: 98 F | OXYGEN SATURATION: 99 % | RESPIRATION RATE: 16 BRPM

## 2025-05-29 DIAGNOSIS — E78.5 HYPERLIPIDEMIA ASSOCIATED WITH TYPE 2 DIABETES MELLITUS: ICD-10-CM

## 2025-05-29 DIAGNOSIS — E11.59 HYPERTENSION ASSOCIATED WITH TYPE 2 DIABETES MELLITUS: ICD-10-CM

## 2025-05-29 DIAGNOSIS — Z12.31 ENCOUNTER FOR SCREENING MAMMOGRAM FOR BREAST CANCER: ICD-10-CM

## 2025-05-29 DIAGNOSIS — E11.65 TYPE 2 DIABETES MELLITUS WITH HYPERGLYCEMIA, WITH LONG-TERM CURRENT USE OF INSULIN: Primary | ICD-10-CM

## 2025-05-29 DIAGNOSIS — E11.69 HYPERLIPIDEMIA ASSOCIATED WITH TYPE 2 DIABETES MELLITUS: ICD-10-CM

## 2025-05-29 DIAGNOSIS — I15.2 HYPERTENSION ASSOCIATED WITH TYPE 2 DIABETES MELLITUS: ICD-10-CM

## 2025-05-29 DIAGNOSIS — N95.9 UNSPECIFIED MENOPAUSAL AND PERIMENOPAUSAL DISORDER: ICD-10-CM

## 2025-05-29 DIAGNOSIS — Z79.4 TYPE 2 DIABETES MELLITUS WITH HYPERGLYCEMIA, WITH LONG-TERM CURRENT USE OF INSULIN: Primary | ICD-10-CM

## 2025-05-29 PROCEDURE — G2211 COMPLEX E/M VISIT ADD ON: HCPCS | Mod: ,,, | Performed by: INTERNAL MEDICINE

## 2025-05-29 PROCEDURE — 99214 OFFICE O/P EST MOD 30 MIN: CPT | Mod: ,,, | Performed by: INTERNAL MEDICINE

## 2025-05-29 RX ORDER — DULAGLUTIDE 4.5 MG/.5ML
INJECTION, SOLUTION SUBCUTANEOUS
COMMUNITY
Start: 2025-04-05

## 2025-05-29 NOTE — PROGRESS NOTES
"Subjective:      Patient ID: Christina Car is a 80 y.o. female.    Chief Complaint: Follow-up (3 month f/u)    HPI  DM follow up  Doing well  Stopped sugar free candies  A1c has improved  Compliant with medications  Health Maintenance Due   Topic Date Due    DEXA Scan  08/09/2025    Diabetic Eye Exam  08/14/2025     Review of Systems   Musculoskeletal:  Positive for arthralgias and back pain.       Objective:     Vitals:    05/29/25 0853   BP: 120/62   BP Location: Left arm   Patient Position: Sitting   Pulse: 69   Resp: 16   Temp: 98.4 °F (36.9 °C)   TempSrc: Oral   SpO2: 99%   Weight: 43.4 kg (95 lb 9.6 oz)   Height: 5' 2" (1.575 m)     Physical Exam  Vitals and nursing note reviewed.   Constitutional:       General: She is not in acute distress.     Appearance: Normal appearance. She is not ill-appearing.   HENT:      Head: Normocephalic and atraumatic.   Neurological:      Mental Status: She is alert.   Psychiatric:         Behavior: Behavior normal.       Assessment/Plan:     1. Type 2 diabetes mellitus with hyperglycemia, with long-term current use of insulin  -     Hemoglobin A1C; Future; Expected date: 08/27/2025  A1c improved  Continue trulicity, jardiance and Lantus  Continue low carb diet  2. Unspecified menopausal and perimenopausal disorder  -     DXA Bone Density Axial Skeleton 1 or more sites; Future; Expected date: 05/29/2025    3. Encounter for screening mammogram for breast cancer  -     Mammo Digital Screening Bilat w/ Roger (XPD); Future; Expected date: 09/17/2025    4. Hypertension associated with type 2 diabetes mellitus  Stable BP continue toprol and benazepril and amlodipine  5. Hyperlipidemia associated with type 2 diabetes mellitus  Stable continue atorvastatin       Visit today included increased complexity associated with the care of the episodic problem type 2 Dm, hypertension, hyperlipidemia addressed and managing the longitudinal care of the patient due to the serious and/or " complex managed problem(s) 20 minutes.      Follow up in about 3 months (around 8/29/2025) for DM follow up a1c due before .    I spent a total of 20 minutes on the day of the visit.This includes face to face time and non-face to face time preparing to see the patient (eg, review of tests), obtaining and/or reviewing separately obtained history, documenting clinical information in the electronic or other health record, independently interpreting results and communicating results to the patient/family/caregiver, or care coordinator.

## 2025-06-02 DIAGNOSIS — E11.59 HYPERTENSION ASSOCIATED WITH TYPE 2 DIABETES MELLITUS: ICD-10-CM

## 2025-06-02 DIAGNOSIS — I15.2 HYPERTENSION ASSOCIATED WITH TYPE 2 DIABETES MELLITUS: ICD-10-CM

## 2025-06-02 RX ORDER — METOPROLOL SUCCINATE 25 MG/1
25 TABLET, EXTENDED RELEASE ORAL
Qty: 90 TABLET | Refills: 0 | Status: SHIPPED | OUTPATIENT
Start: 2025-06-02

## 2025-06-06 DIAGNOSIS — E11.65 TYPE 2 DIABETES MELLITUS WITH HYPERGLYCEMIA, WITHOUT LONG-TERM CURRENT USE OF INSULIN: ICD-10-CM

## 2025-06-06 DIAGNOSIS — I15.2 HYPERTENSION ASSOCIATED WITH TYPE 2 DIABETES MELLITUS: ICD-10-CM

## 2025-06-06 DIAGNOSIS — E11.59 HYPERTENSION ASSOCIATED WITH TYPE 2 DIABETES MELLITUS: ICD-10-CM

## 2025-06-06 RX ORDER — BENAZEPRIL HYDROCHLORIDE 40 MG/1
40 TABLET ORAL
Qty: 90 TABLET | Refills: 0 | Status: SHIPPED | OUTPATIENT
Start: 2025-06-06

## 2025-06-07 DIAGNOSIS — E11.65 TYPE 2 DIABETES MELLITUS WITH HYPERGLYCEMIA, WITHOUT LONG-TERM CURRENT USE OF INSULIN: ICD-10-CM

## 2025-06-07 DIAGNOSIS — I15.2 HYPERTENSION ASSOCIATED WITH TYPE 2 DIABETES MELLITUS: ICD-10-CM

## 2025-06-07 DIAGNOSIS — E11.59 HYPERTENSION ASSOCIATED WITH TYPE 2 DIABETES MELLITUS: ICD-10-CM

## 2025-06-09 RX ORDER — EMPAGLIFLOZIN 25 MG/1
25 TABLET, FILM COATED ORAL EVERY MORNING
Qty: 90 TABLET | Refills: 0 | Status: SHIPPED | OUTPATIENT
Start: 2025-06-09

## 2025-06-16 DIAGNOSIS — E11.59 HYPERTENSION ASSOCIATED WITH TYPE 2 DIABETES MELLITUS: ICD-10-CM

## 2025-06-16 DIAGNOSIS — I15.2 HYPERTENSION ASSOCIATED WITH TYPE 2 DIABETES MELLITUS: ICD-10-CM

## 2025-06-17 RX ORDER — METOPROLOL SUCCINATE 25 MG/1
25 TABLET, EXTENDED RELEASE ORAL
Qty: 90 TABLET | Refills: 0 | Status: SHIPPED | OUTPATIENT
Start: 2025-06-17

## 2025-06-22 DIAGNOSIS — E11.65 TYPE 2 DIABETES MELLITUS WITH HYPERGLYCEMIA, WITH LONG-TERM CURRENT USE OF INSULIN: Primary | ICD-10-CM

## 2025-06-22 DIAGNOSIS — Z79.4 TYPE 2 DIABETES MELLITUS WITH HYPERGLYCEMIA, WITH LONG-TERM CURRENT USE OF INSULIN: Primary | ICD-10-CM

## 2025-06-23 RX ORDER — DULAGLUTIDE 4.5 MG/.5ML
4.5 INJECTION, SOLUTION SUBCUTANEOUS WEEKLY
Qty: 4 PEN | Refills: 1 | Status: SHIPPED | OUTPATIENT
Start: 2025-06-23

## 2025-07-09 ENCOUNTER — CLINICAL SUPPORT (OUTPATIENT)
Dept: DIABETES | Facility: CLINIC | Age: 80
End: 2025-07-09
Payer: MEDICARE

## 2025-07-09 VITALS — HEIGHT: 62 IN | BODY MASS INDEX: 18.47 KG/M2 | WEIGHT: 100.38 LBS

## 2025-07-09 DIAGNOSIS — E11.65 TYPE 2 DIABETES MELLITUS WITH HYPERGLYCEMIA, WITH LONG-TERM CURRENT USE OF INSULIN: Primary | ICD-10-CM

## 2025-07-09 DIAGNOSIS — Z79.4 TYPE 2 DIABETES MELLITUS WITH HYPERGLYCEMIA, WITH LONG-TERM CURRENT USE OF INSULIN: Primary | ICD-10-CM

## 2025-07-09 PROCEDURE — G0108 DIAB MANAGE TRN  PER INDIV: HCPCS | Mod: ,,, | Performed by: FAMILY MEDICINE

## 2025-07-09 NOTE — PROGRESS NOTES
"Diabetes Care Specialist Follow-up Note  Author: Renee Patterson RN  Date: 7/9/2025    Intake    Program Intake  Reason for Diabetes Program Visit:: Post Program Follow-Up  Type of Intervention:: Individual  Individual: Education  Education: Self-Management Skill Review  Type of Follow-Up: 6 month  Current diabetes risk level:: moderate  In the last month, have you used the ER or been admitted to the hospital: No  Permission to speak with others about care:: yes    Current Diabetes Treatment: Oral Medications, DM Injectables, Insulin  Oral Medication Type/Dose: Jardiance 25mg daily  DM Injectables Type/Dose: Trulicity 4.5mg every thursday  Method of insulin delivery?: Injections  Injection Type: Pens  Pen Type/Dose: [id number] units Every AM    Continuous Glucose Monitoring  Patient has CGM: No    Lab Results   Component Value Date    HGBA1C 7.1 (H) 05/28/2025                                                                                8.5%    02/06/25  A1c Pre Diabetes Care Specialist Intervention:  8.0%     10/24/24      Weight: 45.5 kg (100 lb 6.4 oz)   Height: 5' 2" (157.5 cm)   Body mass index is 18.36 kg/m².  Wt gain of 3 lbs since last visit on 3/31/25    Physical activity/Exercise:   She wears back brace but is very active cuts her own grass and does lots of yd work    SMBG:  Once daily  Fasting, has log and reviewed with patient average 110-140 rare high at 181 and 2 readings in the 160s states after eating more carbs      During today's follow-up visit,  the following areas required further assessment and content was provided/reviewed.    Based on today's diabetes care assessment, the following areas of need were identified:      Identified Areas of Need      Medication/Current Diabetes Treatment:  Reviewed Lantus dosing increased to 14 units q.h.s.   Lifestyle Coping/Support:  No   Diabetes Disease Process/Treatment Options:  No   Nutrition/Healthy Eating:   Yes see care plan   Physical " Activity/Exercise:   No   Home Blood Glucose Monitoring:   Yes see care plan   Acute Complications:   Discussed 12-24   Chronic Complications:  Discussed 3/31/25       Today's interventions were provided through individual discussion, instruction, and written materials were provided.    Patient verbalized understanding of instruction and written materials.  Pt was able to return back demonstration of instructions today. Patient understood key points, needs reinforcement and further instruction.     Diabetes Self-Management Care Plan Review and Evaluation of Progress:    During today's follow-up Christina's Diabetes Self-Management Care Plan progress was reviewed and progress was evaluated including his/her input. Christina has agreed to continue his/her journey to improve/maintain overall diabetes control by continuing to set health goals. See care plan progress below.      Care Plan: Diabetes Management   Updates made since 7/9/2024 12:00 AM        Problem: Medications         Long-Range Goal: Patient Agrees to take Lantus 5 units every night. Completed 3/31/2025   Start Date: 11/4/2024   Expected End Date: 12/2/2024   This Visit's Progress: Met   Recent Progress: On track   Priority: High   Barriers: Knowledge deficit   Note:    10/4/2024 New rx for Lantus 5 units qhs, She was able to give shot in office today properly and feel better about taking insulin now.    12/2/2024 States was taking 2 units due to misunderstanding.  She will now take 5units qhs and use 2 units only to prime a new pen. She was also still taking glipizide and jardiance.  Pcp last notes states she could not afford jardiance and d/c glipizide, note sent to pcp for medication clarification   3/31/25 States insulin increased to 12 units daily, doing well with dosing and logging doses.         Task: Reviewed with patient all current diabetes medications and provided basic review of the purpose, dosage, frequency, side effects, and storage of  both oral and injectable diabetes medications. Completed 11/4/2024        Task: Instructed patient on how to self-administer Lantus Completed 11/4/2024        Task: Discussed guidelines for preventing, detecting and treating hypoglycemia and hyperglycemia and reviewed the importance of meal and medication timing with diabetes mediations for prevention of hypoglycemia and maximum drug benefit. Completed 11/4/2024        Problem: Blood Glucose Self-Monitoring         Long-Range Goal: Patient agrees to check and record blood sugars daily. FBS Completed 3/31/2025   Start Date: 11/4/2024   Expected End Date: 12/2/2024   This Visit's Progress: Met   Recent Progress: Met   Priority: Medium   Barriers: Knowledge deficit   Note:    10/4/2024 states doesn't like to test but willing to and log so we can see if medication adjustments needed.   12/2/2024 See log attached. No lows.  Range from 138-176 FBS. Gave her more logs to use and states likes knowing how her glucose is doing and using it as a guide with what she is eating.   3/31/25 See log attached.  Low of 102 high of 184 and 146.  She made comments on some foods.        Task: Reviewed the importance of self-monitoring blood glucose and keeping logs. Completed 11/4/2024        Task: Provided patient with blood glucose logs, reviewed appropriate timing and frequency to SMBG, education on parameters on when to notify provider and advised patient to bring logs to all appts with PCP/Endocrinologist/Diabetes Care Specialist. Completed 11/4/2024        Task: Discussed ways to minimize pain when monitoring blood glucose. Completed 11/4/2024        Problem: Healthy Eating         Long-Range Goal: Patient agrees to pair carbs with protien for meals and snacks. Completed 7/9/2025   Start Date: 12/2/2024   Expected End Date: 3/3/2025   This Visit's Progress: Met   Recent Progress: On track   Priority: Medium   Barriers: Knowledge deficit   Note:    12/02/2024 Reviewed food exchange  list, snack ideas and meal planning tips and gave handouts.   3/31/25 Reminded to pair carbs with protein. Discussed meal planning and plate method.   7/9/25  States she does not eat much meat but eats eggs, peanut butter, and nuts. Reviewed several  food labels in office today to practice. Review portion plate and plate method.        Task: Reviewed the sources and role of Carbohydrate, Protein, and Fat and how each nutrient impacts blood sugar. Completed 3/31/2025        Task: Provided visual examples using dry measuring cups, food models, and other familiar objects such as computer mouse, deck or cards, tennis ball etc. to help with visualization of portions. Completed 3/31/2025        Task: Explained how to count carbohydrates using the food label and the use of dry measuring cups for accurate carb counting. Completed 7/9/2025        Task: Discussed strategies for choosing healthier menu options when dining out. Completed 7/9/2025        Task: Recommended replacing beverages containing high sugar content with noncaloric/sugar free options and/or water. Completed 3/31/2025        Task: Review the importance of balancing carbohydrates with each meal using portion control techniques to count servings of carbohydrate and label reading to identify serving size and amount of total carbs per serving. Completed 12/2/2024        Task: Provided Sample plate method and reviewed the use of the plate to estimate amounts of carbohydrate per meal. Completed 12/2/2024          Follow Up Plan     Follow up in about 6 months (around 1/9/2026) for New Assessment .  Appointment made with patient today for January 14, 2026 at 10:30 a.m.    Today's care plan and follow up schedule was discussed with patient.  Christina verbalized understanding of the care plan, goals, and agrees to follow up plan.        The patient was encouraged to communicate with his/her health care provider/physician and care team regarding his/her condition(s)  and treatment.  I provided the patient with my contact information today and encouraged to contact me via phone or [facility]'s Patient Portal as needed.     Length of Visit   Total Time: 60 Minutes

## 2025-07-21 ENCOUNTER — TELEPHONE (OUTPATIENT)
Dept: FAMILY MEDICINE | Facility: CLINIC | Age: 80
End: 2025-07-21
Payer: MEDICARE

## 2025-07-21 RX ORDER — INSULIN DEGLUDEC 100 U/ML
14 INJECTION, SOLUTION SUBCUTANEOUS DAILY
Qty: 4.2 ML | Refills: 11 | Status: SHIPPED | OUTPATIENT
Start: 2025-07-21 | End: 2026-07-21

## 2025-07-21 NOTE — TELEPHONE ENCOUNTER
Copied from CRM #[id number]. Topic: Medications - Pharmacy  >> Jul 21, 2025  9:21 AM Ivory wrote:  .Who Called: [facility]    Pharmacy is calling to request assistance with Rx    Pharmacy name and phone number: [facility]   Pharmacy contact: [facility] pharmacist   Patient Name: joselito collins   Prescription Name:    insulin glargine U-100, Lantus, (LANTUS SOLOSTAR U-100 INSULIN) 100 unit/mL (3 mL) InPn pen    What do they need to clarify?:states that the pt insurance does not cover anymore. The insurance can cover tresida flex touch or basaglar         Preferred Method of Contact: Phone Call  Patient's Preferred Phone Number on File: [phone]  Best Call Back Number, if different:  Additional Information:

## 2025-07-21 NOTE — TELEPHONE ENCOUNTER
states that the pt insurance does not cover anymore. The insurance can cover tresida flex touch or basaglar

## 2025-07-21 NOTE — TELEPHONE ENCOUNTER
I have signed for the following orders AND/OR meds. Please notify the patient and ask the patient to schedule the testing and/or information about any medications that were sent.      Rx was changed from Lantus to Tresiba  Please call patient to update her once she runs out of her current insulin Rx at home, she will switch to Tresiba, it will have same directions as far as dose is concerned as the previous insulin Rx  thanks    Medications Ordered This Encounter   Medications    insulin degludec (TRESIBA FLEXTOUCH U-100) 100 unit/mL (3 mL) insulin pen     Sig: Inject 14 Units into the skin once daily.     Dispense:  4.2 mL     Refill:  11     D/c Rx for Lantus due to insurance formulary change     No orders of the defined types were placed in this encounter.

## 2025-08-12 DIAGNOSIS — E11.59 HYPERTENSION ASSOCIATED WITH TYPE 2 DIABETES MELLITUS: ICD-10-CM

## 2025-08-12 DIAGNOSIS — I15.2 HYPERTENSION ASSOCIATED WITH TYPE 2 DIABETES MELLITUS: ICD-10-CM

## 2025-08-12 RX ORDER — AMLODIPINE BESYLATE 10 MG/1
10 TABLET ORAL
Qty: 90 TABLET | Refills: 0 | Status: SHIPPED | OUTPATIENT
Start: 2025-08-12

## 2025-08-19 DIAGNOSIS — Z79.4 TYPE 2 DIABETES MELLITUS WITH HYPERGLYCEMIA, WITH LONG-TERM CURRENT USE OF INSULIN: ICD-10-CM

## 2025-08-19 DIAGNOSIS — E11.65 TYPE 2 DIABETES MELLITUS WITH HYPERGLYCEMIA, WITH LONG-TERM CURRENT USE OF INSULIN: ICD-10-CM

## 2025-08-19 RX ORDER — DULAGLUTIDE 4.5 MG/.5ML
INJECTION, SOLUTION SUBCUTANEOUS
Qty: 4 PEN | Refills: 11 | Status: SHIPPED | OUTPATIENT
Start: 2025-08-19

## 2025-08-26 ENCOUNTER — LAB VISIT (OUTPATIENT)
Dept: LAB | Facility: HOSPITAL | Age: 80
End: 2025-08-26
Attending: INTERNAL MEDICINE
Payer: MEDICARE

## 2025-08-29 ENCOUNTER — OFFICE VISIT (OUTPATIENT)
Dept: FAMILY MEDICINE | Facility: CLINIC | Age: 80
End: 2025-08-29
Payer: MEDICARE

## 2025-08-29 VITALS
WEIGHT: 102.13 LBS | HEIGHT: 62 IN | RESPIRATION RATE: 20 BRPM | SYSTOLIC BLOOD PRESSURE: 148 MMHG | BODY MASS INDEX: 18.8 KG/M2 | TEMPERATURE: 98 F | OXYGEN SATURATION: 98 % | HEART RATE: 78 BPM | DIASTOLIC BLOOD PRESSURE: 88 MMHG

## 2025-08-29 DIAGNOSIS — E11.65 TYPE 2 DIABETES MELLITUS WITH HYPERGLYCEMIA, WITHOUT LONG-TERM CURRENT USE OF INSULIN: Primary | ICD-10-CM

## 2025-08-29 DIAGNOSIS — E78.5 HYPERLIPIDEMIA ASSOCIATED WITH TYPE 2 DIABETES MELLITUS: ICD-10-CM

## 2025-08-29 DIAGNOSIS — I15.2 HYPERTENSION ASSOCIATED WITH TYPE 2 DIABETES MELLITUS: ICD-10-CM

## 2025-08-29 DIAGNOSIS — E11.59 HYPERTENSION ASSOCIATED WITH TYPE 2 DIABETES MELLITUS: ICD-10-CM

## 2025-08-29 DIAGNOSIS — E11.69 HYPERLIPIDEMIA ASSOCIATED WITH TYPE 2 DIABETES MELLITUS: ICD-10-CM
